# Patient Record
Sex: FEMALE | Race: OTHER | HISPANIC OR LATINO | Employment: UNEMPLOYED | URBAN - METROPOLITAN AREA
[De-identification: names, ages, dates, MRNs, and addresses within clinical notes are randomized per-mention and may not be internally consistent; named-entity substitution may affect disease eponyms.]

---
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
## 2023-02-27 ENCOUNTER — OFFICE VISIT (OUTPATIENT)
Dept: URGENT CARE | Age: 18
End: 2023-02-27

## 2023-02-27 VITALS
SYSTOLIC BLOOD PRESSURE: 120 MMHG | OXYGEN SATURATION: 98 % | DIASTOLIC BLOOD PRESSURE: 60 MMHG | RESPIRATION RATE: 18 BRPM | HEART RATE: 100 BPM | TEMPERATURE: 97.7 F

## 2023-02-27 DIAGNOSIS — R11.2 NAUSEA, VOMITING AND DIARRHEA: Primary | ICD-10-CM

## 2023-02-27 DIAGNOSIS — R19.7 NAUSEA, VOMITING AND DIARRHEA: Primary | ICD-10-CM

## 2023-02-27 NOTE — PATIENT INSTRUCTIONS
Drink liquids as directed  Ask your healthcare provider how much liquid to drink each day, and which liquids are best for you  You may also need to drink an oral rehydration solution (ORS)  An ORS has the right amounts of sugar, salt, and minerals in water to replace body fluids  Eat bland foods  When you feel hungry, begin eating soft, bland foods  Examples are bananas, clear soup, potatoes, and applesauce  Do not have dairy products, alcohol, sugary drinks, or drinks with caffeine until you feel better  Rest as much as possible  Slowly start to do more each day when you begin to feel better

## 2023-02-27 NOTE — LETTER
February 27, 2023     Patient: Emma Jasso   YOB: 2005   Date of Visit: 2/27/2023       To Whom it May Concern:    Jazmin Llanes was seen in my clinic on 2/27/2023  She may return to school on 3/1/2023 as long as her symptoms have resolved       If you have any questions or concerns, please don't hesitate to call           Sincerely,          Benigno Pereira, RUBEN        CC: No Recipients

## 2023-02-27 NOTE — PROGRESS NOTES
[facility]        NAME: Nevin Coffey is a 21 y o  female  : 2005    MRN: [id number]  DATE: 2023  TIME: 3:51 PM    Assessment and Plan   Nausea, vomiting and diarrhea [R11 2, R19 7]  1  Nausea, vomiting and diarrhea              Patient Instructions     Ensure adequate fluid intake, normal urine output  Sugarcreek diet as described  Follow up with PCP in 3-5 days  Proceed to  ER if symptoms worsen  Chief Complaint     Chief Complaint   Patient presents with   • Vomiting     Patient started to having N/V/D on Friday  She was experiencing a burning in her lower abdominal area and then was throwing up for a couple days  She started to feel better slightly and then when she woke this morning and she felt worse that she has since Friday  She is going to the [place] about 10 times a day and throws up with every BM  She states she is still trying to eat and drink but very light  History of Present Illness       Patient presenting for evaluation of abdominal pain, vomiting and diarrhea  Patient states that she had abdominal pain 4 days ago, and the next day began with vomiting  Patient states that her emesis is bilious, but denies any blood  She states that today she began having nonbloody diarrhea  She denies any exposure to anyone with similar symptoms  She states that she has decreased appetite, but is tolerating drinking and eating  She states that her urine output is normal   She denies any fevers or chills at this time  Review of Systems   Review of Systems   Constitutional: Negative for chills and fever  HENT: Negative for congestion and sore throat  Respiratory: Negative for cough and shortness of breath  Cardiovascular: Negative for chest pain  Gastrointestinal: Positive for abdominal pain, diarrhea, nausea and vomiting  All other systems reviewed and are negative  Current Medications     No current outpatient medications on file      Current Allergies     Allergies as of 02/27/2023   • (No Known Allergies)            The following portions of the patient's history were reviewed and updated as appropriate: allergies, current medications, past family history, past medical history, past social history, past surgical history and problem list      Past Medical History:   Diagnosis Date   • Allergic rhinitis        Past Surgical History:   Procedure Laterality Date   • TONSILLECTOMY         Family History   Problem Relation Age of Onset   • Crohn's disease Mother    • No Known Problems Father    • Asthma Brother    • Diabetes Family    • Hyperlipidemia Family    • Heart disease Family          Medications have been verified  Objective   /60   Pulse 100   Temp 97 7 °F (36 5 °C)   Resp 18   SpO2 98%        Physical Exam     Physical Exam  Vitals and nursing note reviewed  Constitutional:       General: She is not in acute distress  Appearance: Normal appearance  She is not ill-appearing, toxic-appearing or diaphoretic  HENT:      Head: Normocephalic and atraumatic  Eyes:      General:         Right eye: No discharge  Left eye: No discharge  Cardiovascular:      Rate and Rhythm: Normal rate and regular rhythm  Pulses: Normal pulses  Heart sounds: Normal heart sounds  No murmur heard  No friction rub  No gallop  Pulmonary:      Effort: Pulmonary effort is normal  No respiratory distress  Breath sounds: Normal breath sounds  No stridor  No wheezing, rhonchi or rales  Chest:      Chest wall: No tenderness  Abdominal:      General: Bowel sounds are normal       Palpations: Abdomen is soft  Tenderness: There is no abdominal tenderness  There is no guarding or rebound  Skin:     General: Skin is warm and dry  Neurological:      Mental Status: She is alert     Psychiatric:         Mood and Affect: Mood normal          Behavior: Behavior normal

## 2023-05-16 ENCOUNTER — HOSPITAL ENCOUNTER (EMERGENCY)
Facility: HOSPITAL | Age: 18
Discharge: HOME | End: 2023-05-16
Attending: EMERGENCY MEDICINE

## 2023-05-16 VITALS
SYSTOLIC BLOOD PRESSURE: 118 MMHG | OXYGEN SATURATION: 98 % | DIASTOLIC BLOOD PRESSURE: 59 MMHG | HEART RATE: 71 BPM | RESPIRATION RATE: 16 BRPM | HEIGHT: 64 IN | BODY MASS INDEX: 40.65 KG/M2 | TEMPERATURE: 98.7 F | WEIGHT: 238.1 LBS

## 2023-05-16 DIAGNOSIS — M54.50 ACUTE LOW BACK PAIN: Primary | ICD-10-CM

## 2023-05-16 RX ORDER — ACETAMINOPHEN 325 MG/1
975 TABLET ORAL ONCE
Status: COMPLETED | OUTPATIENT
Start: 2023-05-16 | End: 2023-05-16

## 2023-05-16 RX ORDER — ACETAMINOPHEN 500 MG
500 TABLET ORAL EVERY 6 HOURS PRN
Qty: 30 TABLET | Refills: 0 | Status: SHIPPED | OUTPATIENT
Start: 2023-05-16

## 2023-05-16 RX ORDER — NAPROXEN 500 MG/1
500 TABLET ORAL 2 TIMES DAILY WITH MEALS
Qty: 30 TABLET | Refills: 0 | Status: SHIPPED | OUTPATIENT
Start: 2023-05-16

## 2023-05-16 RX ORDER — KETOROLAC TROMETHAMINE 30 MG/ML
15 INJECTION, SOLUTION INTRAMUSCULAR; INTRAVENOUS ONCE
Status: COMPLETED | OUTPATIENT
Start: 2023-05-16 | End: 2023-05-16

## 2023-05-16 RX ADMIN — ACETAMINOPHEN 325MG 975 MG: 325 TABLET ORAL at 18:37

## 2023-05-16 RX ADMIN — KETOROLAC TROMETHAMINE 15 MG: 30 INJECTION, SOLUTION INTRAMUSCULAR; INTRAVENOUS at 18:38

## 2023-05-16 NOTE — Clinical Note
Francisco Casas was seen and treated in our emergency department on 5/16/2023  Diagnosis:     Magan Mcknight  may return to work on return date  She may return on this date: 05/18/2023         If you have any questions or concerns, please don't hesitate to call        Tory Alfaro PA-C    ______________________________           _______________          _______________  Hospital Representative                              Date                                Time

## 2023-05-16 NOTE — Clinical Note
Zuleykaraza Carthage was seen and treated in our emergency department on 5/16/2023  Diagnosis:     Dawna Acosta  may return to school on return date  She may return on this date: 05/18/2023         If you have any questions or concerns, please don't hesitate to call        Claudia Pressley PA-C    ______________________________           _______________          _______________  Hospital Representative                              Date                                Time

## 2023-07-11 ENCOUNTER — OFFICE VISIT (OUTPATIENT)
Dept: URGENT CARE | Age: 18
End: 2023-07-11
Payer: COMMERCIAL

## 2023-07-11 VITALS
SYSTOLIC BLOOD PRESSURE: 139 MMHG | TEMPERATURE: 98.7 F | HEART RATE: 80 BPM | OXYGEN SATURATION: 100 % | RESPIRATION RATE: 16 BRPM | DIASTOLIC BLOOD PRESSURE: 79 MMHG

## 2023-07-11 DIAGNOSIS — M26.629 TMJ SYNDROME: Primary | ICD-10-CM

## 2023-07-11 PROCEDURE — 99213 OFFICE O/P EST LOW 20 MIN: CPT

## 2023-07-11 NOTE — PATIENT INSTRUCTIONS
Tylenol/ibuprofen as needed  Ice 20 minutes 3-4 times per day  Insulate the skin from the ice to prevent frostbite  Follow up with PCP in 3-5 days. Proceed to ER if symptoms worsen.

## 2023-07-11 NOTE — PROGRESS NOTES
[facility]        NAME: Veronica Bonilla is a 21 y.o. female  : 2005    MRN: [id number]  DATE: 2023  TIME: 6:00 PM    Assessment and Plan   TMJ syndrome [M26.629]  1. TMJ syndrome              Patient Instructions     Tylenol/ibuprofen as needed  Ice 20 minutes 3-4 times per day  Insulate the skin from the ice to prevent frostbite  Follow up with PCP in 3-5 days. Proceed to  ER if symptoms worsen. Chief Complaint     Chief Complaint   Patient presents with   • Earache     Right jaw and right ear pain x 2 days          History of Present Illness       Patient is a 22 yo female with no significant PMH presenting in the clinic today for right ear pain x 2 days. Admits pain is located along the right TMJ. Denies fever, chills, chest pain, SOB, headache, dizziness, ear discharge, tinnitus, and sore throat. Denies the use of OTC treatment for symptom management. Review of Systems   Review of Systems   Constitutional: Negative for chills and fever. HENT: Positive for ear pain. Negative for dental problem, ear discharge, hearing loss, sore throat and tinnitus. Respiratory: Negative for shortness of breath. Cardiovascular: Negative for chest pain. Neurological: Negative for dizziness and headaches.          Current Medications       Current Outpatient Medications:   •  acetaminophen (TYLENOL) 500 mg tablet, Take 1 tablet (500 mg total) by mouth every 6 (six) hours as needed for mild pain, Disp: 30 tablet, Rfl: 0  •  naproxen (Naprosyn) 500 mg tablet, Take 1 tablet (500 mg total) by mouth 2 (two) times a day with meals, Disp: 30 tablet, Rfl: 0    Current Allergies     Allergies as of 2023   • (No Known Allergies)            The following portions of the patient's history were reviewed and updated as appropriate: allergies, current medications, past family history, past medical history, past social history, past surgical history and problem list.     Past Medical History: Diagnosis Date   • Allergic rhinitis        Past Surgical History:   Procedure Laterality Date   • TONSILLECTOMY         Family History   Problem Relation Age of Onset   • Crohn's disease Mother    • No Known Problems Father    • Asthma Brother    • Diabetes Family    • Hyperlipidemia Family    • Heart disease Family          Medications have been verified. Objective   /79 (BP Location: Left arm, Patient Position: Sitting, Cuff Size: Adult)   Pulse 80   Temp 98.7 °F (37.1 °C) (Tympanic)   Resp 16   SpO2 100%        Physical Exam     Physical Exam  Vitals reviewed. Constitutional:       General: She is not in acute distress. Appearance: Normal appearance. She is normal weight. She is not ill-appearing. HENT:      Head: Normocephalic. Jaw: Tenderness present. Comments: TTP of right TMJ     Right Ear: Hearing, tympanic membrane, ear canal and external ear normal. No middle ear effusion. There is no impacted cerumen. Tympanic membrane is not erythematous or bulging. Left Ear: Hearing, tympanic membrane, ear canal and external ear normal.  No middle ear effusion. There is no impacted cerumen. Tympanic membrane is not erythematous or bulging. Nose: Nose normal. No congestion or rhinorrhea. Right Sinus: No maxillary sinus tenderness or frontal sinus tenderness. Left Sinus: No maxillary sinus tenderness or frontal sinus tenderness. Mouth/Throat:      Lips: Pink. No lesions. Mouth: Mucous membranes are moist.      Pharynx: Oropharynx is clear. Uvula midline. No pharyngeal swelling, oropharyngeal exudate, posterior oropharyngeal erythema or uvula swelling. Tonsils: No tonsillar exudate or tonsillar abscesses. 1+ on the right. 1+ on the left. Eyes:      Conjunctiva/sclera: Conjunctivae normal.   Cardiovascular:      Rate and Rhythm: Normal rate and regular rhythm. Pulses: Normal pulses. Heart sounds: Normal heart sounds. No murmur heard. No friction rub. No gallop. Pulmonary:      Effort: Pulmonary effort is normal.      Breath sounds: Normal breath sounds. No wheezing, rhonchi or rales. Musculoskeletal:      Cervical back: Normal range of motion and neck supple. Skin:     General: Skin is warm. Findings: No rash. Neurological:      Mental Status: She is alert.    Psychiatric:         Mood and Affect: Mood normal.         Behavior: Behavior normal.

## 2023-07-11 NOTE — LETTER
July 11, 2023     Patient: Vladimir Sutton   YOB: 2005   Date of Visit: 7/11/2023       To Whom it May Concern:    Shirley Arevalo was seen in my clinic on 7/11/2023. If you have any questions or concerns, please don't hesitate to call.          Sincerely,          Kathrin Brian PA-C        CC: No Recipients

## 2023-07-13 ENCOUNTER — HOSPITAL ENCOUNTER (EMERGENCY)
Facility: HOSPITAL | Age: 18
Discharge: HOME | End: 2023-07-14
Attending: EMERGENCY MEDICINE
Payer: COMMERCIAL

## 2023-07-13 VITALS
RESPIRATION RATE: 18 BRPM | HEART RATE: 85 BPM | SYSTOLIC BLOOD PRESSURE: 114 MMHG | BODY MASS INDEX: 40.45 KG/M2 | WEIGHT: 235.67 LBS | TEMPERATURE: 97.9 F | OXYGEN SATURATION: 98 % | DIASTOLIC BLOOD PRESSURE: 56 MMHG

## 2023-07-13 DIAGNOSIS — R19.7 NAUSEA, VOMITING, AND DIARRHEA: ICD-10-CM

## 2023-07-13 DIAGNOSIS — R11.2 NAUSEA, VOMITING, AND DIARRHEA: ICD-10-CM

## 2023-07-13 DIAGNOSIS — H66.91 RIGHT OTITIS MEDIA: Primary | ICD-10-CM

## 2023-07-13 LAB
EXT PREGNANCY TEST URINE: NEGATIVE
EXT. CONTROL: NORMAL

## 2023-07-13 PROCEDURE — 96375 TX/PRO/DX INJ NEW DRUG ADDON: CPT

## 2023-07-13 PROCEDURE — 96361 HYDRATE IV INFUSION ADD-ON: CPT

## 2023-07-13 PROCEDURE — 96374 THER/PROPH/DIAG INJ IV PUSH: CPT

## 2023-07-13 PROCEDURE — 99284 EMERGENCY DEPT VISIT MOD MDM: CPT

## 2023-07-13 PROCEDURE — 99283 EMERGENCY DEPT VISIT LOW MDM: CPT

## 2023-07-13 PROCEDURE — 81025 URINE PREGNANCY TEST: CPT

## 2023-07-13 RX ORDER — KETOROLAC TROMETHAMINE 30 MG/ML
15 INJECTION, SOLUTION INTRAMUSCULAR; INTRAVENOUS ONCE
Status: COMPLETED | OUTPATIENT
Start: 2023-07-13 | End: 2023-07-13

## 2023-07-13 RX ORDER — AMOXICILLIN 250 MG/1
500 CAPSULE ORAL ONCE
Status: COMPLETED | OUTPATIENT
Start: 2023-07-13 | End: 2023-07-13

## 2023-07-13 RX ORDER — ONDANSETRON 2 MG/ML
4 INJECTION INTRAMUSCULAR; INTRAVENOUS ONCE
Status: COMPLETED | OUTPATIENT
Start: 2023-07-13 | End: 2023-07-13

## 2023-07-13 RX ADMIN — AMOXICILLIN 500 MG: 250 CAPSULE ORAL at 23:24

## 2023-07-13 RX ADMIN — KETOROLAC TROMETHAMINE 15 MG: 30 INJECTION, SOLUTION INTRAMUSCULAR; INTRAVENOUS at 23:25

## 2023-07-13 RX ADMIN — ONDANSETRON 4 MG: 2 INJECTION INTRAMUSCULAR; INTRAVENOUS at 23:25

## 2023-07-13 RX ADMIN — SODIUM CHLORIDE 1000 ML: 0.9 INJECTION, SOLUTION INTRAVENOUS at 23:27

## 2023-07-13 NOTE — Clinical Note
Reena Lobatocarlos was seen and treated in our emergency department on 7/13/2023. Diagnosis:     Jayden Felix  may return to school on return date. She may return on this date: 07/17/2023         If you have any questions or concerns, please don't hesitate to call.       Maci Kennedy PA-C    ______________________________           _______________          _______________  Hospital Representative                              Date                                Time

## 2023-07-14 RX ORDER — AMOXICILLIN 500 MG/1
500 CAPSULE ORAL EVERY 12 HOURS SCHEDULED
Qty: 20 CAPSULE | Refills: 0 | Status: SHIPPED | OUTPATIENT
Start: 2023-07-14 | End: 2023-07-24

## 2023-07-14 RX ORDER — ONDANSETRON 4 MG/1
4 TABLET, FILM COATED ORAL EVERY 6 HOURS PRN
Qty: 20 TABLET | Refills: 0 | Status: SHIPPED | OUTPATIENT
Start: 2023-07-14

## 2023-07-14 NOTE — ED PROVIDER NOTES
History  Chief Complaint   Patient presents with   • Medical Problem     Pt reports being seen at Urgent care on Tuesday and was told that she has TMJ, last night started with fever, nausea, vomiting and diarrhea. Last Motrin about 6:30pm.      The patient is a 42-year-old female with no pertinent past medical history, who presents for evaluation of flulike symptoms. Right ear pain since Sunday, tried candle for earwax without difference in the pain  Went to urgent care and was diagnosed with TMJ  Has had persistent pain since that time is now radiating up toward her head  Last night also developed nausea, vomiting, diarrhea, decreased appetite fevers  -now with epigastric discomfort as she has only been keeping fluids down  No known sick contacts or suspicious food intake  Last dose of motrin at 6:30 PM    EXAM:  Right ear with cloudly fluid behind TM; slightly bulging  Not erythematous or injected  NO LAD  Throat and nose fine          Prior to Admission Medications   Prescriptions Last Dose Informant Patient Reported? Taking?   acetaminophen (TYLENOL) 500 mg tablet   No No   Sig: Take 1 tablet (500 mg total) by mouth every 6 (six) hours as needed for mild pain   naproxen (Naprosyn) 500 mg tablet   No No   Sig: Take 1 tablet (500 mg total) by mouth 2 (two) times a day with meals      Facility-Administered Medications: None       Past Medical History:   Diagnosis Date   • Allergic rhinitis        Past Surgical History:   Procedure Laterality Date   • TONSILLECTOMY         Family History   Problem Relation Age of Onset   • Crohn's disease Mother    • No Known Problems Father    • Asthma Brother    • Diabetes Family    • Hyperlipidemia Family    • Heart disease Family      I have reviewed and agree with the history as documented.     E-Cigarette/Vaping   • E-Cigarette Use Never User      E-Cigarette/Vaping Substances     Social History     Tobacco Use   • Smoking status: Never   • Smokeless tobacco: Never   Vaping Use   • Vaping Use: Never used   Substance Use Topics   • Alcohol use: No     Comment: No alcohol use - As per [employer]    • Drug use: No     Comment: Denied: History of drug use - As per [employer]        Review of Systems    Physical Exam  Physical Exam    Vital Signs  ED Triage Vitals [07/13/23 1946]   Temperature Pulse Respirations Blood Pressure SpO2   100 °F (37.8 °C) 102 18 161/77 98 %      Temp Source Heart Rate Source Patient Position - Orthostatic VS BP Location FiO2 (%)   Oral Monitor Sitting Right arm --      Pain Score       6           Vitals:    07/13/23 1946 07/13/23 2209   BP: 161/77 114/56   Pulse: 102 85   Patient Position - Orthostatic VS: Sitting Lying         Visual Acuity      ED Medications  Medications   sodium chloride 0.9 % bolus 1,000 mL (has no administration in time range)   ondansetron (ZOFRAN) injection 4 mg (has no administration in time range)   ketorolac (TORADOL) injection 15 mg (has no administration in time range)   amoxicillin (AMOXIL) capsule 500 mg (has no administration in time range)       Diagnostic Studies  Results Reviewed     Procedure Component Value Units Date/Time    POCT pregnancy, urine [[id number]]     Lab Status: No result                  No orders to display              Procedures  Procedures         ED Course         CRAFFT    Flowsheet Row Most Recent Value   CRAFFT Initial Screen: During the past 12 months, did you:    1. Drink any alcohol (more than a few sips)? No Filed at: 07/13/2023 2210   2. Smoke any marijuana or hashish No Filed at: 07/13/2023 2210   3. Use anything else to get high? ("anything else" includes illegal drugs, over the counter and prescription drugs, and things that you sniff or 'suarez')?  No Filed at: 07/13/2023 2210                                          MDM    Disposition  Final diagnoses:   None     ED Disposition     None      Follow-up Information    None         Patient's Medications   Discharge Prescriptions    No medications on file       No discharge procedures on file.     PDMP Review     None          ED Provider  Electronically Signed by tonsillar, preauricular or posterior auricular adenopathy. Cervical: No cervical adenopathy. Skin:     General: Skin is warm and dry. Capillary Refill: Capillary refill takes less than 2 seconds. Coloration: Skin is not pale. Findings: No petechiae or rash. Neurological:      Mental Status: She is alert and oriented to person, place, and time. Psychiatric:         Behavior: Behavior is cooperative. Vital Signs  ED Triage Vitals [07/13/23 1946]   Temperature Pulse Respirations Blood Pressure SpO2   100 °F (37.8 °C) 102 18 161/77 98 %      Temp Source Heart Rate Source Patient Position - Orthostatic VS BP Location FiO2 (%)   Oral Monitor Sitting Right arm --      Pain Score       6           Vitals:    07/13/23 1946 07/13/23 2209   BP: 161/77 114/56   Pulse: 102 85   Patient Position - Orthostatic VS: Sitting Lying         ED Medications  Medications   sodium chloride 0.9 % bolus 1,000 mL (0 mL Intravenous Stopped 7/14/23 0034)   ondansetron (ZOFRAN) injection 4 mg (4 mg Intravenous Given 7/13/23 2325)   ketorolac (TORADOL) injection 15 mg (15 mg Intravenous Given 7/13/23 2325)   amoxicillin (AMOXIL) capsule 500 mg (500 mg Oral Given 7/13/23 2324)       Diagnostic Studies  Results Reviewed     Procedure Component Value Units Date/Time    POCT pregnancy, urine [[id number]]  (Normal) Resulted: 07/13/23 2320    Lab Status: Final result Updated: 07/14/23 0035     EXT Preg Test, Ur Negative     Control Valid                 No orders to display              Procedures  Procedures         ED Course         CRAFFT    Flowsheet Row Most Recent Value   CRAFFT Initial Screen: During the past 12 months, did you:    1. Drink any alcohol (more than a few sips)? No Filed at: 07/13/2023 2210   2. Smoke any marijuana or hashish No Filed at: 07/13/2023 2210   3.  Use anything else to get high? ("anything else" includes illegal drugs, over the counter and prescription drugs, and things that you sniff or 'erick')? No Filed at: 07/13/2023 0550            Medical Decision Making  Patient presents with several days of right ear pain as well as 2 days of N/V/D with associated abdominal discomfort. Differential diagnosis includes but is not limited to otitis media, TM rupture, otitis externa, cerumen impaction, TMJ arthralgia, viral illness, gastroenteritis, gastritis, hepatitis, or food poisoning. Physical exam findings for the ear are most consistent with otitis media. Patient reported significant relief in her symptoms after fluids, Toradol, and Zofran. Prescriptions for amoxicillin and Zofran sent to the patient's pharmacy. She is in agreement with the treatment plan and all of her questions were answered to the best my ability. Strict return precaution discussed and she verbalized understanding. Follow-up with PCP, and return to the ED in the interim with new or worsening symptoms. Nausea, vomiting, and diarrhea: acute illness or injury  Right otitis media: acute illness or injury  Amount and/or Complexity of Data Reviewed  External Data Reviewed: labs and notes. Labs: ordered. Risk  Prescription drug management. Disposition  Final diagnoses:   Right otitis media   Nausea, vomiting, and diarrhea     Time reflects when diagnosis was documented in both MDM as applicable and the Disposition within this note     Time User Action Codes Description Comment    7/14/2023 12:19 AM Key Calvillo Add [H66.91] Right otitis media     7/14/2023 12:19 AM Key Calvillo Add [R11.2,  R19.7] Nausea, vomiting, and diarrhea       ED Disposition     ED Disposition   Discharge    Condition   Stable    Date/Time   Fri Jul 14, 2023 12:19 AM    Comment   Danitza Cook discharge to home/self care.                Follow-up Information     Follow up With Specialties Details Why Contact Info    Your primary care doctor              Discharge Medication List as of 7/14/2023 12:20 AM      START taking these medications Details   amoxicillin (AMOXIL) 500 mg capsule Take 1 capsule (500 mg total) by mouth every 12 (twelve) hours for 10 days, Starting Fri 7/14/2023, Until Mon 7/24/2023, Normal      ondansetron (Zofran) 4 mg tablet Take 1 tablet (4 mg total) by mouth every 6 (six) hours as needed for nausea or vomiting, Starting Fri 7/14/2023, Print         CONTINUE these medications which have NOT CHANGED    Details   acetaminophen (TYLENOL) 500 mg tablet Take 1 tablet (500 mg total) by mouth every 6 (six) hours as needed for mild pain, Starting Tue 5/16/2023, Normal      naproxen (Naprosyn) 500 mg tablet Take 1 tablet (500 mg total) by mouth 2 (two) times a day with meals, Starting Tue 5/16/2023, Normal             No discharge procedures on file.     PDMP Review     None          ED Provider  Electronically Signed by           Marlo Huggins PA-C  07/31/23 6284

## 2023-12-22 ENCOUNTER — OFFICE VISIT (OUTPATIENT)
Dept: URGENT CARE | Age: 18
End: 2023-12-22
Payer: COMMERCIAL

## 2023-12-22 VITALS
RESPIRATION RATE: 20 BRPM | TEMPERATURE: 97.3 F | DIASTOLIC BLOOD PRESSURE: 73 MMHG | HEART RATE: 77 BPM | SYSTOLIC BLOOD PRESSURE: 122 MMHG | BODY MASS INDEX: 36.9 KG/M2 | WEIGHT: 215 LBS | OXYGEN SATURATION: 98 %

## 2023-12-22 DIAGNOSIS — B96.89 ACUTE BACTERIAL BRONCHITIS: Primary | ICD-10-CM

## 2023-12-22 DIAGNOSIS — J20.8 ACUTE BACTERIAL BRONCHITIS: Primary | ICD-10-CM

## 2023-12-22 PROCEDURE — 99214 OFFICE O/P EST MOD 30 MIN: CPT | Performed by: PHYSICIAN ASSISTANT

## 2023-12-22 RX ORDER — ALBUTEROL SULFATE 90 UG/1
2 AEROSOL, METERED RESPIRATORY (INHALATION) EVERY 6 HOURS PRN
Qty: 8.5 G | Refills: 0 | Status: SHIPPED | OUTPATIENT
Start: 2023-12-22

## 2023-12-22 RX ORDER — AZITHROMYCIN 250 MG/1
TABLET, FILM COATED ORAL
Qty: 6 TABLET | Refills: 0 | Status: SHIPPED | OUTPATIENT
Start: 2023-12-22 | End: 2023-12-26

## 2023-12-22 NOTE — PATIENT INSTRUCTIONS
Take azithromycin as prescribed.  Use albuterol inhaler as needed as directed.  See attached instructions.  Mucinex DM as needed for cough.  If symptoms not improved in 2 to 3 days follow-up with PCP.  If symptoms worsen or new symptoms develop report to the emergency room immediately.

## 2023-12-22 NOTE — PROGRESS NOTES
[facility]        NAME: Santa Galdamez is a 18 y.o. female  : 2005    MRN: [id number]  DATE: 2023  TIME: 4:52 PM    Assessment and Plan   Acute bacterial bronchitis [J20.8, B96.89]  1. Acute bacterial bronchitis  azithromycin (ZITHROMAX) 250 mg tablet    albuterol (ProAir HFA) 90 mcg/act inhaler      Patient presents with symptoms and examination consistent with possible bacterial bronchitis.  She will be started on azithromycin to treat.  I have also provided her with an albuterol inhaler I recommend Mucinex DM for cough at this time.      Patient Instructions     Patient Instructions   Take azithromycin as prescribed.  Use albuterol inhaler as needed as directed.  See attached instructions.  Mucinex DM as needed for cough.  If symptoms not improved in 2 to 3 days follow-up with PCP.  If symptoms worsen or new symptoms develop report to the emergency room immediately.    Follow up with PCP in 3-5 days.  Proceed to  ER if symptoms worsen.    Chief Complaint     Chief Complaint   Patient presents with    Cough    Vomiting    Headache    Nausea     Vomiting, headache, nausea, chest congestion, cough,and mucus color yellow for 2 weeks.         History of Present Illness       18 year old female presents with complaints of headache, runny nose, sinus congestion, cough, chest congestion, nausea and vomiting and intermittent shortness of breath for 2 weeks. Pt has tried multiple over the counter medications with no benefit. Nasal drainage is clear, but sputum is yellow.     Cough  Associated symptoms include headaches, postnasal drip, rhinorrhea, a sore throat and shortness of breath. Pertinent negatives include no chest pain, chills or fever.   Vomiting   Associated symptoms include coughing and headaches. Pertinent negatives include no chest pain, chills, diarrhea or fever.   Headache  Nausea  Associated symptoms include congestion, coughing, headaches, nausea, a sore throat and vomiting.  Pertinent negatives include no chest pain, chills or fever.       Review of Systems   Review of Systems   Constitutional:  Negative for chills and fever.   HENT:  Positive for congestion, postnasal drip, rhinorrhea and sore throat.    Respiratory:  Positive for cough and shortness of breath.    Cardiovascular:  Negative for chest pain.   Gastrointestinal:  Positive for nausea and vomiting. Negative for diarrhea.   Neurological:  Positive for headaches.         Current Medications       Current Outpatient Medications:     albuterol (ProAir HFA) 90 mcg/act inhaler, Inhale 2 puffs every 6 (six) hours as needed for wheezing, Disp: 8.5 g, Rfl: 0    azithromycin (ZITHROMAX) 250 mg tablet, Take 2 tablets today then 1 tablet daily x 4 days, Disp: 6 tablet, Rfl: 0    acetaminophen (TYLENOL) 500 mg tablet, Take 1 tablet (500 mg total) by mouth every 6 (six) hours as needed for mild pain (Patient not taking: Reported on 12/22/2023), Disp: 30 tablet, Rfl: 0    naproxen (Naprosyn) 500 mg tablet, Take 1 tablet (500 mg total) by mouth 2 (two) times a day with meals (Patient not taking: Reported on 12/22/2023), Disp: 30 tablet, Rfl: 0    ondansetron (Zofran) 4 mg tablet, Take 1 tablet (4 mg total) by mouth every 6 (six) hours as needed for nausea or vomiting (Patient not taking: Reported on 12/22/2023), Disp: 20 tablet, Rfl: 0    Current Allergies     Allergies as of 12/22/2023    (No Known Allergies)            The following portions of the patient's history were reviewed and updated as appropriate: allergies, current medications, past family history, past medical history, past social history, past surgical history and problem list.     Past Medical History:   Diagnosis Date    Allergic rhinitis        Past Surgical History:   Procedure Laterality Date    TONSILLECTOMY         Family History   Problem Relation Age of Onset    Crohn's disease Mother     No Known Problems Father     Asthma Brother     Diabetes Family      Hyperlipidemia Family     Heart disease Family          Medications have been verified.        Objective   /73   Pulse 77   Temp (!) 97.3 °F (36.3 °C) (Temporal)   Resp 20   Wt 97.5 kg (215 lb)   LMP 12/22/2023 Comment: on her period now  SpO2 98%   BMI 36.90 kg/m²   Patient's last menstrual period was 12/22/2023.       Physical Exam     Physical Exam  Vitals and nursing note reviewed.   Constitutional:       General: She is not in acute distress.     Appearance: Normal appearance. She is not ill-appearing or toxic-appearing.   HENT:      Head: Normocephalic and atraumatic.      Jaw: No trismus.      Right Ear: Tympanic membrane, ear canal and external ear normal. There is no impacted cerumen. No foreign body.      Left Ear: Tympanic membrane, ear canal and external ear normal. There is no impacted cerumen. No foreign body.      Nose: No nasal deformity, mucosal edema, congestion or rhinorrhea.      Right Nostril: No foreign body, epistaxis or occlusion.      Left Nostril: No foreign body, epistaxis or occlusion.      Right Turbinates: Not enlarged, swollen or pale.      Left Turbinates: Not enlarged, swollen or pale.      Mouth/Throat:      Lips: Pink. No lesions.      Mouth: Mucous membranes are moist. No injury, oral lesions or angioedema.      Dentition: Normal dentition.      Tongue: No lesions. Tongue does not deviate from midline.      Palate: No mass and lesions.      Pharynx: Uvula midline. No pharyngeal swelling, oropharyngeal exudate, posterior oropharyngeal erythema or uvula swelling.      Tonsils: No tonsillar exudate or tonsillar abscesses.   Eyes:      General: Lids are normal. Gaze aligned appropriately. No allergic shiner.     Extraocular Movements: Extraocular movements intact.   Cardiovascular:      Rate and Rhythm: Normal rate and regular rhythm.      Heart sounds: Normal heart sounds, S1 normal and S2 normal. Heart sounds not distant. No murmur heard.     No friction rub. No gallop.  "  Pulmonary:      Effort: Pulmonary effort is normal.      Breath sounds: Normal breath sounds. No decreased breath sounds, wheezing, rhonchi or rales.      Comments: Patient speaking in full sentences with no increased respiratory effort.   No audible wheezing or stridor.   Lymphadenopathy:      Cervical: No cervical adenopathy.   Skin:     General: Skin is warm and dry.   Neurological:      Mental Status: She is alert and oriented to person, place, and time.      Coordination: Coordination is intact.      Gait: Gait is intact.   Psychiatric:         Attention and Perception: Attention and perception normal.         Mood and Affect: Mood and affect normal.         Speech: Speech normal.         Behavior: Behavior is cooperative.               Note: Portions of this record may have been created with voice recognition software. Occasional wrong word or \"sound a like\" substitutions may have occurred due to the inherent limitations of voice recognition software. Please read the chart carefully and recognize, using context, where substitutions have occurred.*      "

## 2023-12-22 NOTE — LETTER
December 22, 2023     Patient: Santa Galdamez   YOB: 2005   Date of Visit: 12/22/2023       To Whom it May Concern:    Santa Galdamez was seen in my clinic on 12/22/2023. She may return to school on 12/26/2023 .    If you have any questions or concerns, please don't hesitate to call.         Sincerely,          Kati Valdes PA-C        CC: No Recipients

## 2024-09-29 ENCOUNTER — HOSPITAL ENCOUNTER (EMERGENCY)
Facility: HOSPITAL | Age: 19
Discharge: HOME | DRG: 720 | End: 2024-09-29
Attending: EMERGENCY MEDICINE
Payer: COMMERCIAL

## 2024-09-29 ENCOUNTER — APPOINTMENT (EMERGENCY)
Dept: RADIOLOGY | Facility: HOSPITAL | Age: 19
DRG: 720 | End: 2024-09-29
Attending: EMERGENCY MEDICINE
Payer: COMMERCIAL

## 2024-09-29 VITALS
HEART RATE: 95 BPM | RESPIRATION RATE: 18 BRPM | OXYGEN SATURATION: 97 % | DIASTOLIC BLOOD PRESSURE: 90 MMHG | SYSTOLIC BLOOD PRESSURE: 113 MMHG | TEMPERATURE: 100.7 F

## 2024-09-29 DIAGNOSIS — R10.9 FLANK PAIN: Primary | ICD-10-CM

## 2024-09-29 DIAGNOSIS — R19.7 NAUSEA, VOMITING, AND DIARRHEA: ICD-10-CM

## 2024-09-29 DIAGNOSIS — R11.2 NAUSEA, VOMITING, AND DIARRHEA: ICD-10-CM

## 2024-09-29 DIAGNOSIS — N12 PYELONEPHRITIS: ICD-10-CM

## 2024-09-29 LAB
ALBUMIN SERPL BCG-MCNC: 4.5 G/DL (ref 3.5–5)
ALP SERPL-CCNC: 47 U/L (ref 34–104)
ALT SERPL W P-5'-P-CCNC: 24 U/L (ref 7–52)
ANION GAP SERPL CALCULATED.3IONS-SCNC: 10 MMOL/L (ref 4–13)
AST SERPL W P-5'-P-CCNC: 18 U/L (ref 13–39)
BACTERIA UR QL AUTO: ABNORMAL /HPF
BASOPHILS # BLD AUTO: 0.02 THOUSANDS/ΜL (ref 0–0.1)
BASOPHILS NFR BLD AUTO: 0 % (ref 0–1)
BILIRUB SERPL-MCNC: 0.62 MG/DL (ref 0.2–1)
BILIRUB UR QL STRIP: NEGATIVE
BUN SERPL-MCNC: 12 MG/DL (ref 5–25)
CALCIUM SERPL-MCNC: 9.3 MG/DL (ref 8.4–10.2)
CHLORIDE SERPL-SCNC: 103 MMOL/L (ref 96–108)
CLARITY UR: ABNORMAL
CO2 SERPL-SCNC: 24 MMOL/L (ref 21–32)
COLOR UR: ABNORMAL
CREAT SERPL-MCNC: 0.62 MG/DL (ref 0.6–1.3)
EOSINOPHIL # BLD AUTO: 0.01 THOUSAND/ΜL (ref 0–0.61)
EOSINOPHIL NFR BLD AUTO: 0 % (ref 0–6)
ERYTHROCYTE [DISTWIDTH] IN BLOOD BY AUTOMATED COUNT: 12.4 % (ref 11.6–15.1)
EXT PREGNANCY TEST URINE: NEGATIVE
EXT. CONTROL: NORMAL
GFR SERPL CREATININE-BSD FRML MDRD: 129 ML/MIN/1.73SQ M
GLUCOSE SERPL-MCNC: 102 MG/DL (ref 65–140)
GLUCOSE UR STRIP-MCNC: NEGATIVE MG/DL
HCT VFR BLD AUTO: 38.9 % (ref 34.8–46.1)
HGB BLD-MCNC: 13.4 G/DL (ref 11.5–15.4)
HGB UR QL STRIP.AUTO: ABNORMAL
IMM GRANULOCYTES # BLD AUTO: 0.04 THOUSAND/UL (ref 0–0.2)
IMM GRANULOCYTES NFR BLD AUTO: 0 % (ref 0–2)
KETONES UR STRIP-MCNC: NEGATIVE MG/DL
LEUKOCYTE ESTERASE UR QL STRIP: ABNORMAL
LIPASE SERPL-CCNC: 11 U/L (ref 11–82)
LYMPHOCYTES # BLD AUTO: 0.63 THOUSANDS/ΜL (ref 0.6–4.47)
LYMPHOCYTES NFR BLD AUTO: 5 % (ref 14–44)
MCH RBC QN AUTO: 29.1 PG (ref 26.8–34.3)
MCHC RBC AUTO-ENTMCNC: 34.4 G/DL (ref 31.4–37.4)
MCV RBC AUTO: 84 FL (ref 82–98)
MONOCYTES # BLD AUTO: 1.06 THOUSAND/ΜL (ref 0.17–1.22)
MONOCYTES NFR BLD AUTO: 9 % (ref 4–12)
MUCOUS THREADS UR QL AUTO: ABNORMAL
NEUTROPHILS # BLD AUTO: 10.59 THOUSANDS/ΜL (ref 1.85–7.62)
NEUTS SEG NFR BLD AUTO: 86 % (ref 43–75)
NITRITE UR QL STRIP: NEGATIVE
NON-SQ EPI CELLS URNS QL MICRO: ABNORMAL /HPF
NRBC BLD AUTO-RTO: 0 /100 WBCS
PH UR STRIP.AUTO: 5.5 [PH]
PLATELET # BLD AUTO: 307 THOUSANDS/UL (ref 149–390)
PMV BLD AUTO: 9.3 FL (ref 8.9–12.7)
POTASSIUM SERPL-SCNC: 3.9 MMOL/L (ref 3.5–5.3)
PROT SERPL-MCNC: 7.6 G/DL (ref 6.4–8.4)
PROT UR STRIP-MCNC: ABNORMAL MG/DL
RBC # BLD AUTO: 4.61 MILLION/UL (ref 3.81–5.12)
RBC #/AREA URNS AUTO: ABNORMAL /HPF
SODIUM SERPL-SCNC: 137 MMOL/L (ref 135–147)
SP GR UR STRIP.AUTO: 1.02 (ref 1–1.03)
UROBILINOGEN UR STRIP-ACNC: <2 MG/DL
WBC # BLD AUTO: 12.35 THOUSAND/UL (ref 4.31–10.16)
WBC #/AREA URNS AUTO: ABNORMAL /HPF

## 2024-09-29 PROCEDURE — 96375 TX/PRO/DX INJ NEW DRUG ADDON: CPT

## 2024-09-29 PROCEDURE — 85025 COMPLETE CBC W/AUTO DIFF WBC: CPT

## 2024-09-29 PROCEDURE — 87186 SC STD MICRODIL/AGAR DIL: CPT

## 2024-09-29 PROCEDURE — 96365 THER/PROPH/DIAG IV INF INIT: CPT

## 2024-09-29 PROCEDURE — 96376 TX/PRO/DX INJ SAME DRUG ADON: CPT

## 2024-09-29 PROCEDURE — 87086 URINE CULTURE/COLONY COUNT: CPT

## 2024-09-29 PROCEDURE — 80053 COMPREHEN METABOLIC PANEL: CPT

## 2024-09-29 PROCEDURE — 96361 HYDRATE IV INFUSION ADD-ON: CPT

## 2024-09-29 PROCEDURE — 74177 CT ABD & PELVIS W/CONTRAST: CPT

## 2024-09-29 PROCEDURE — 36415 COLL VENOUS BLD VENIPUNCTURE: CPT

## 2024-09-29 PROCEDURE — 81001 URINALYSIS AUTO W/SCOPE: CPT

## 2024-09-29 PROCEDURE — 83690 ASSAY OF LIPASE: CPT

## 2024-09-29 PROCEDURE — 99285 EMERGENCY DEPT VISIT HI MDM: CPT | Performed by: EMERGENCY MEDICINE

## 2024-09-29 PROCEDURE — 81025 URINE PREGNANCY TEST: CPT

## 2024-09-29 PROCEDURE — 99284 EMERGENCY DEPT VISIT MOD MDM: CPT

## 2024-09-29 PROCEDURE — 87077 CULTURE AEROBIC IDENTIFY: CPT

## 2024-09-29 RX ORDER — ONDANSETRON 4 MG/1
4 TABLET, FILM COATED ORAL EVERY 6 HOURS PRN
Qty: 20 TABLET | Refills: 0 | Status: SHIPPED | OUTPATIENT
Start: 2024-09-29 | End: 2024-09-30

## 2024-09-29 RX ORDER — ACETAMINOPHEN 325 MG/1
975 TABLET ORAL ONCE
Status: COMPLETED | OUTPATIENT
Start: 2024-09-29 | End: 2024-09-29

## 2024-09-29 RX ORDER — KETOROLAC TROMETHAMINE 30 MG/ML
15 INJECTION, SOLUTION INTRAMUSCULAR; INTRAVENOUS ONCE
Status: COMPLETED | OUTPATIENT
Start: 2024-09-29 | End: 2024-09-29

## 2024-09-29 RX ORDER — CEFPODOXIME PROXETIL 200 MG/1
200 TABLET, FILM COATED ORAL 2 TIMES DAILY
Qty: 28 TABLET | Refills: 0 | Status: SHIPPED | OUTPATIENT
Start: 2024-09-29 | End: 2024-09-30

## 2024-09-29 RX ORDER — OXYCODONE HYDROCHLORIDE 5 MG/1
5 TABLET ORAL ONCE
Status: COMPLETED | OUTPATIENT
Start: 2024-09-29 | End: 2024-09-29

## 2024-09-29 RX ADMIN — SODIUM CHLORIDE 1000 ML: 0.9 INJECTION, SOLUTION INTRAVENOUS at 13:49

## 2024-09-29 RX ADMIN — KETOROLAC TROMETHAMINE 15 MG: 30 INJECTION, SOLUTION INTRAMUSCULAR at 13:34

## 2024-09-29 RX ADMIN — KETOROLAC TROMETHAMINE 15 MG: 30 INJECTION, SOLUTION INTRAMUSCULAR at 16:12

## 2024-09-29 RX ADMIN — OXYCODONE HYDROCHLORIDE 5 MG: 5 TABLET ORAL at 16:12

## 2024-09-29 RX ADMIN — CEFTRIAXONE SODIUM 2000 MG: 10 INJECTION, POWDER, FOR SOLUTION INTRAVENOUS at 15:23

## 2024-09-29 RX ADMIN — ACETAMINOPHEN 975 MG: 325 TABLET ORAL at 13:35

## 2024-09-29 RX ADMIN — IOHEXOL 85 ML: 350 INJECTION, SOLUTION INTRAVENOUS at 14:56

## 2024-09-29 NOTE — ED ATTENDING ATTESTATION
9/29/2024  I, Mike Laguna MD, saw and evaluated the patient. I have discussed the patient with the resident/non-physician practitioner and agree with the resident's/non-physician practitioner's findings, Plan of Care, and MDM as documented in the resident's/non-physician practitioner's note, except where noted. All available labs and Radiology studies were reviewed.  I was present for key portions of any procedure(s) performed by the resident/non-physician practitioner and I was immediately available to provide assistance.       At this point I agree with the current assessment done in the Emergency Department.  I have conducted an independent evaluation of this patient a history and physical is as follows:  Patient presents with complaints of right flank and right abdominal pain that started yesterday late evening early morning she just did not feel well felt hot and cold this morning states she has some trouble urinating no dysuria or frequency of urination no prior kidney stone history  Patient is currently menstruating he denies vaginal discharge some nausea but no vomiting  States the pain seems better if she still worse if she moves a certain way  No diarrhea no melena  Exam the patient's somewhat uncomfortable appearing she is febrile at 100.7 sclera are anicteric mucous members are moist lungs clear heart regular soft positive bowel sounds there is tenderness in the right lower quadrant there is tenderness over the right flank area  Differential considerations would include pyelonephritis, kidney stone, appendicitis,  Urine urine pregnancy test labs pain control CT  ED Course         Critical Care Time  Procedures

## 2024-09-29 NOTE — ED PROVIDER NOTES
Final diagnoses:   Flank pain   Pyelonephritis     ED Disposition       ED Disposition   Discharge    Condition   Stable    Date/Time   Sun Sep 29, 2024  3:12 PM    Comment   Santa Galdamez discharge to home/self care.                   Assessment & Plan       Medical Decision Making  Patient is 19-year-old female presenting for flank pain, nausea.  DDx: Pyelonephritis, ureterolithiasis/nephrolithiasis, UTI, rule out pregnancy  Based on patient presentation and physical exam findings, primary concern is for pyelonephritis versus nephrolithiasis versus UTI.  Plan for abdominal lab workup, evaluate for pregnancy.  Pain control Toradol Tylenol fluids.  Reassessment pending labs and imaging.  UA concerning for infection.  Plan for dose of ceftriaxone given clinical suspicion for pyelonephritis.  Will plan for discharge with return precautions and outpatient prescription of cefpodoxime for coverage of pyelonephritis.  Patient understands agrees with plan.  Return cautions given.  Ready for discharge.    Problems Addressed:  Flank pain: acute illness or injury  Pyelonephritis: acute illness or injury    Amount and/or Complexity of Data Reviewed  Labs: ordered. Decision-making details documented in ED Course.  Radiology: ordered.    Risk  OTC drugs.  Prescription drug management.        ED Course as of 09/29/24 1606   Sun Sep 29, 2024   1354 WBC(!): 12.35   1606 On reassessment, patient much more comfortable appearing.  After discussion and mutual decision making we will plan for discharge with oral antibiotics and conservative pain control at home.  Strict return precautions given.  Patient understands and agrees with plan.  Ready for discharge.  Prescription sent to pharmacy.       Medications   ketorolac (TORADOL) injection 15 mg (has no administration in time range)   oxyCODONE (ROXICODONE) IR tablet 5 mg (has no administration in time range)   sodium chloride 0.9 % bolus 1,000 mL (1,000 mL Intravenous New Bag 9/29/24  1349)   acetaminophen (TYLENOL) tablet 975 mg (975 mg Oral Given 9/29/24 1335)   ketorolac (TORADOL) injection 15 mg (15 mg Intravenous Given 9/29/24 1334)   iohexol (OMNIPAQUE) 350 MG/ML injection (MULTI-DOSE) 85 mL (85 mL Intravenous Given 9/29/24 1456)   ceftriaxone (ROCEPHIN) 2 g/50 mL in dextrose IVPB (2,000 mg Intravenous New Bag 9/29/24 1523)       ED Risk Strat Scores                           SBIRT 20yo+      Flowsheet Row Most Recent Value   Initial Alcohol Screen: US AUDIT-C     1. How often do you have a drink containing alcohol? 0 Filed at: 09/29/2024 1312   2. How many drinks containing alcohol do you have on a typical day you are drinking?  0 Filed at: 09/29/2024 1312   3a. Male UNDER 65: How often do you have five or more drinks on one occasion? 0 Filed at: 09/29/2024 1312   3b. FEMALE Any Age, or MALE 65+: How often do you have 4 or more drinks on one occassion? 0 Filed at: 09/29/2024 1312   Audit-C Score 0 Filed at: 09/29/2024 1312   TK: How many times in the past year have you...    Used an illegal drug or used a prescription medication for non-medical reasons? Never Filed at: 09/29/2024 1312                            History of Present Illness       Chief Complaint   Patient presents with    Flank Pain     Pt has c/o R sided flank pain that started last night       Past Medical History:   Diagnosis Date    Allergic rhinitis       Past Surgical History:   Procedure Laterality Date    TONSILLECTOMY        Family History   Problem Relation Age of Onset    Crohn's disease Mother     No Known Problems Father     Asthma Brother     Diabetes Family     Hyperlipidemia Family     Heart disease Family       Social History     Tobacco Use    Smoking status: Never    Smokeless tobacco: Never   Vaping Use    Vaping status: Never Used   Substance Use Topics    Alcohol use: No     Comment: No alcohol use - As per [employer]     Drug use: No     Comment: Denied: History of drug use - As per [employer]        E-Cigarette/Vaping    E-Cigarette Use Never User       E-Cigarette/Vaping Substances      I have reviewed and agree with the history as documented.     HPI  Patient is 19-year-old female presenting for concerns of right flank pain radiation in the lower abdomen as well as now radiation to the left flank on presentation.  No significant past medical history.  Patient states symptoms began in the last 24 to 48 hours.  Endorses mild nausea but no vomiting no diarrhea.  Denies any vaginal bleeding or discharge.  Denies any dysuria or hematuria.  Patient has never had kidney stones in the past.  Review of Systems   Constitutional: Negative.    HENT: Negative.     Eyes: Negative.    Respiratory: Negative.     Cardiovascular: Negative.    Gastrointestinal:  Positive for abdominal pain and nausea. Negative for diarrhea and vomiting.   Endocrine: Negative.    Genitourinary:  Positive for flank pain.   Skin: Negative.    Allergic/Immunologic: Negative.    Neurological: Negative.    Hematological: Negative.    Psychiatric/Behavioral: Negative.     All other systems reviewed and are negative.          Objective       ED Triage Vitals [09/29/24 1309]   Temperature Pulse Blood Pressure Respirations SpO2 Patient Position - Orthostatic VS   (!) 100.7 °F (38.2 °C) (!) 108 147/83 18 96 % Sitting      Temp Source Heart Rate Source BP Location FiO2 (%) Pain Score    Oral Monitor Left arm -- 10 - Worst Possible Pain      Vitals      Date and Time Temp Pulse SpO2 Resp BP Pain Score FACES Pain Rating User   09/29/24 1334 -- -- -- -- -- No Pain -- BL   09/29/24 1309 100.7 °F (38.2 °C) 108 96 % 18 147/83 10 - Worst Possible Pain -- KRR            Physical Exam  Vitals and nursing note reviewed.   Constitutional:       Appearance: She is normal weight.      Comments: Patient is uncomfortable appearing secondary to pain but vital stable.   HENT:      Head: Normocephalic and atraumatic.      Right Ear: Tympanic membrane, ear canal and  external ear normal.      Left Ear: Tympanic membrane, ear canal and external ear normal.      Nose: Nose normal.      Mouth/Throat:      Mouth: Mucous membranes are moist.      Pharynx: Oropharynx is clear.   Eyes:      Extraocular Movements: Extraocular movements intact.      Conjunctiva/sclera: Conjunctivae normal.      Pupils: Pupils are equal, round, and reactive to light.   Cardiovascular:      Rate and Rhythm: Regular rhythm. Tachycardia present.      Pulses: Normal pulses.      Heart sounds: Normal heart sounds.   Pulmonary:      Effort: Pulmonary effort is normal.      Breath sounds: Normal breath sounds.   Abdominal:      General: Abdomen is flat. Bowel sounds are normal.      Palpations: Abdomen is soft.      Tenderness: There is abdominal tenderness. There is no guarding or rebound.      Comments: Mild tenderness palpation in the suprapubic area however no rebound no guarding.   Musculoskeletal:         General: Normal range of motion.      Cervical back: Normal range of motion and neck supple.   Skin:     General: Skin is warm and dry.      Capillary Refill: Capillary refill takes less than 2 seconds.   Neurological:      General: No focal deficit present.      Mental Status: She is alert and oriented to person, place, and time.   Psychiatric:         Mood and Affect: Mood normal.         Behavior: Behavior normal.         Thought Content: Thought content normal.         Judgment: Judgment normal.         Results Reviewed       Procedure Component Value Units Date/Time    Comprehensive metabolic panel [[id number]] Collected: 09/29/24 1337    Lab Status: Final result Specimen: Blood from Arm, Left Updated: 09/29/24 1414     Sodium 137 mmol/L      Potassium 3.9 mmol/L      Chloride 103 mmol/L      CO2 24 mmol/L      ANION GAP 10 mmol/L      BUN 12 mg/dL      Creatinine 0.62 mg/dL      Glucose 102 mg/dL      Calcium 9.3 mg/dL      AST 18 U/L      ALT 24 U/L      Alkaline Phosphatase 47 U/L      Total  Protein 7.6 g/dL      Albumin 4.5 g/dL      Total Bilirubin 0.62 mg/dL      eGFR 129 ml/min/1.73sq m     Narrative:      National Kid[employer] guidelines for Chronic Kidney Disease (CKD):     Stage 1 with normal or high GFR (GFR > 90 mL/min/1.73 square meters)    Stage 2 Mild CKD (GFR = 60-89 mL/min/1.73 square meters)    Stage 3A Moderate CKD (GFR = 45-59 mL/min/1.73 square meters)    Stage 3B Moderate CKD (GFR = 30-44 mL/min/1.73 square meters)    Stage 4 Severe CKD (GFR = 15-29 mL/min/1.73 square meters)    Stage 5 End Stage CKD (GFR <15 mL/min/1.73 square meters)  Note: GFR calculation is accurate only with a steady state creatinine    Lipase [[id number]]  (Normal) Collected: 09/29/24 1337    Lab Status: Final result Specimen: Blood from Arm, Left Updated: 09/29/24 1414     Lipase 11 u/L     Urine Microscopic [[id number]]  (Abnormal) Collected: 09/29/24 1346    Lab Status: Final result Specimen: Urine, Clean Catch Updated: 09/29/24 1409     RBC, UA Innumerable /hpf      WBC, UA Innumerable /hpf      Epithelial Cells Occasional /hpf      Bacteria, UA Occasional /hpf      MUCUS THREADS Occasional    Urine culture [[id number]] Collected: 09/29/24 1346    Lab Status: In process Specimen: Urine, Clean Catch Updated: 09/29/24 1409    UA w Reflex to Microscopic w Reflex to Culture [[id number]]  (Abnormal) Collected: 09/29/24 1346    Lab Status: Final result Specimen: Urine, Clean Catch Updated: 09/29/24 1358     Color, UA Light Yellow     Clarity, UA Turbid     Specific Gravity, UA 1.017     pH, UA 5.5     Leukocytes, UA Large     Nitrite, UA Negative     Protein, UA 30 (1+) mg/dl      Glucose, UA Negative mg/dl      Ketones, UA Negative mg/dl      Urobilinogen, UA <2.0 mg/dl      Bilirubin, UA Negative     Occult Blood, UA Large    POCT pregnancy, urine [[id number]]  (Normal) Resulted: 09/29/24 1349    Lab Status: Final result Updated: 09/29/24 1349     EXT Preg Test, Ur Negative     Control Valid    CBC and  differential [[id number]]  (Abnormal) Collected: 09/29/24 1337    Lab Status: Final result Specimen: Blood from Arm, Left Updated: 09/29/24 1347     WBC 12.35 Thousand/uL      RBC 4.61 Million/uL      Hemoglobin 13.4 g/dL      Hematocrit 38.9 %      MCV 84 fL      MCH 29.1 pg      MCHC 34.4 g/dL      RDW 12.4 %      MPV 9.3 fL      Platelets 307 Thousands/uL      nRBC 0 /100 WBCs      Segmented % 86 %      Immature Grans % 0 %      Lymphocytes % 5 %      Monocytes % 9 %      Eosinophils Relative 0 %      Basophils Relative 0 %      Absolute Neutrophils 10.59 Thousands/µL      Absolute Immature Grans 0.04 Thousand/uL      Absolute Lymphocytes 0.63 Thousands/µL      Absolute Monocytes 1.06 Thousand/µL      Eosinophils Absolute 0.01 Thousand/µL      Basophils Absolute 0.02 Thousands/µL             CT abdomen pelvis with contrast   Final Interpretation by Vasile Flaherty MD (09/29 1537)      Heterogeneous enhancement of the right renal upper pole with perinephric stranding consistent with acute pyelonephritis.         Workstation performed: [id number]             Procedures    ED Medication and Procedure Management   Prior to Admission Medications   Prescriptions Last Dose Informant Patient Reported? Taking?   acetaminophen (TYLENOL) 500 mg tablet   No No   Sig: Take 1 tablet (500 mg total) by mouth every 6 (six) hours as needed for mild pain   Patient not taking: Reported on 12/22/2023   albuterol (ProAir HFA) 90 mcg/act inhaler   No No   Sig: Inhale 2 puffs every 6 (six) hours as needed for wheezing   naproxen (Naprosyn) 500 mg tablet   No No   Sig: Take 1 tablet (500 mg total) by mouth 2 (two) times a day with meals   Patient not taking: Reported on 12/22/2023   ondansetron (Zofran) 4 mg tablet   No No   Sig: Take 1 tablet (4 mg total) by mouth every 6 (six) hours as needed for nausea or vomiting   Patient not taking: Reported on 12/22/2023   ondansetron (Zofran) 4 mg tablet   No Yes   Sig: Take 1 tablet (4 mg total)  by mouth every 6 (six) hours as needed for nausea or vomiting      Facility-Administered Medications: None     Patient's Medications   Discharge Prescriptions    CEFPODOXIME (VANTIN) 200 MG TABLET    Take 1 tablet (200 mg total) by mouth 2 (two) times a day for 14 days       Start Date: 9/29/2024 End Date: 10/13/2024       Order Dose: 200 mg       Quantity: 28 tablet    Refills: 0     No discharge procedures on file.  ED SEPSIS DOCUMENTATION   Time reflects when diagnosis was documented in both MDM as applicable and the Disposition within this note       Time User Action Codes Description Comment    9/29/2024  3:12 PM Rex Guerrero [R10.9] Flank pain     9/29/2024  3:12 PM Rex Guerrero [N12] Pyelonephritis     9/29/2024  4:05 PM Rex Guerrero [R11.2,  R19.7] Nausea, vomiting, and diarrhea                  Rex Guerrero MD  09/29/24 6948

## 2024-09-29 NOTE — DISCHARGE INSTRUCTIONS
Please take antibiotic as prescribed for treatment of concerns of possible pyelonephritis.  Please follow-up with your family care physician as needed.    Your CT results are below:  CT abdomen pelvis with contrast    Result Date: 9/29/2024  Impression: Heterogeneous enhancement of the right renal upper pole with perinephric stranding consistent with acute pyelonephritis. Workstation performed: [id number]

## 2024-09-30 ENCOUNTER — HOSPITAL ENCOUNTER (INPATIENT)
Facility: HOSPITAL | Age: 19
LOS: 1 days | Discharge: HOME | DRG: 720 | End: 2024-10-03
Attending: EMERGENCY MEDICINE | Admitting: FAMILY MEDICINE
Payer: COMMERCIAL

## 2024-09-30 DIAGNOSIS — N12 PYELONEPHRITIS: Primary | ICD-10-CM

## 2024-09-30 DIAGNOSIS — R51.9 HEADACHE: ICD-10-CM

## 2024-09-30 DIAGNOSIS — R11.0 NAUSEA: ICD-10-CM

## 2024-09-30 PROBLEM — A41.9 SEPSIS (HCC): Status: ACTIVE | Noted: 2024-09-30

## 2024-09-30 LAB
ANION GAP SERPL CALCULATED.3IONS-SCNC: 7 MMOL/L (ref 4–13)
ATRIAL RATE: 100 BPM
BASOPHILS # BLD AUTO: 0.03 THOUSANDS/ΜL (ref 0–0.1)
BASOPHILS NFR BLD AUTO: 0 % (ref 0–1)
BUN SERPL-MCNC: 19 MG/DL (ref 5–25)
CALCIUM SERPL-MCNC: 8.1 MG/DL (ref 8.4–10.2)
CARDIAC TROPONIN I PNL SERPL HS: 3 NG/L
CHLORIDE SERPL-SCNC: 103 MMOL/L (ref 96–108)
CO2 SERPL-SCNC: 26 MMOL/L (ref 21–32)
CREAT SERPL-MCNC: 1.29 MG/DL (ref 0.6–1.3)
EOSINOPHIL # BLD AUTO: 0.05 THOUSAND/ΜL (ref 0–0.61)
EOSINOPHIL NFR BLD AUTO: 0 % (ref 0–6)
ERYTHROCYTE [DISTWIDTH] IN BLOOD BY AUTOMATED COUNT: 12.9 % (ref 11.6–15.1)
GFR SERPL CREATININE-BSD FRML MDRD: 59 ML/MIN/1.73SQ M
GLUCOSE SERPL-MCNC: 171 MG/DL (ref 65–140)
HCT VFR BLD AUTO: 31.6 % (ref 34.8–46.1)
HGB BLD-MCNC: 10.9 G/DL (ref 11.5–15.4)
IMM GRANULOCYTES # BLD AUTO: 0.08 THOUSAND/UL (ref 0–0.2)
IMM GRANULOCYTES NFR BLD AUTO: 1 % (ref 0–2)
LYMPHOCYTES # BLD AUTO: 0.57 THOUSANDS/ΜL (ref 0.6–4.47)
LYMPHOCYTES NFR BLD AUTO: 4 % (ref 14–44)
MCH RBC QN AUTO: 29.5 PG (ref 26.8–34.3)
MCHC RBC AUTO-ENTMCNC: 34.5 G/DL (ref 31.4–37.4)
MCV RBC AUTO: 85 FL (ref 82–98)
MONOCYTES # BLD AUTO: 0.96 THOUSAND/ΜL (ref 0.17–1.22)
MONOCYTES NFR BLD AUTO: 7 % (ref 4–12)
NEUTROPHILS # BLD AUTO: 11.96 THOUSANDS/ΜL (ref 1.85–7.62)
NEUTS SEG NFR BLD AUTO: 88 % (ref 43–75)
NRBC BLD AUTO-RTO: 0 /100 WBCS
P AXIS: 35 DEGREES
PLATELET # BLD AUTO: 224 THOUSANDS/UL (ref 149–390)
PMV BLD AUTO: 9.7 FL (ref 8.9–12.7)
POTASSIUM SERPL-SCNC: 3.6 MMOL/L (ref 3.5–5.3)
PR INTERVAL: 146 MS
QRS AXIS: 92 DEGREES
QRSD INTERVAL: 92 MS
QT INTERVAL: 340 MS
QTC INTERVAL: 438 MS
RBC # BLD AUTO: 3.7 MILLION/UL (ref 3.81–5.12)
SODIUM SERPL-SCNC: 136 MMOL/L (ref 135–147)
T WAVE AXIS: 40 DEGREES
VENTRICULAR RATE: 100 BPM
WBC # BLD AUTO: 13.65 THOUSAND/UL (ref 4.31–10.16)

## 2024-09-30 PROCEDURE — 93005 ELECTROCARDIOGRAM TRACING: CPT

## 2024-09-30 PROCEDURE — 80048 BASIC METABOLIC PNL TOTAL CA: CPT | Performed by: STUDENT IN AN ORGANIZED HEALTH CARE EDUCATION/TRAINING PROGRAM

## 2024-09-30 PROCEDURE — 36415 COLL VENOUS BLD VENIPUNCTURE: CPT | Performed by: STUDENT IN AN ORGANIZED HEALTH CARE EDUCATION/TRAINING PROGRAM

## 2024-09-30 PROCEDURE — 93010 ELECTROCARDIOGRAM REPORT: CPT | Performed by: INTERNAL MEDICINE

## 2024-09-30 PROCEDURE — 84484 ASSAY OF TROPONIN QUANT: CPT | Performed by: STUDENT IN AN ORGANIZED HEALTH CARE EDUCATION/TRAINING PROGRAM

## 2024-09-30 PROCEDURE — 85025 COMPLETE CBC W/AUTO DIFF WBC: CPT | Performed by: STUDENT IN AN ORGANIZED HEALTH CARE EDUCATION/TRAINING PROGRAM

## 2024-09-30 PROCEDURE — 99223 1ST HOSP IP/OBS HIGH 75: CPT

## 2024-09-30 PROCEDURE — 99284 EMERGENCY DEPT VISIT MOD MDM: CPT

## 2024-09-30 PROCEDURE — 96368 THER/DIAG CONCURRENT INF: CPT

## 2024-09-30 PROCEDURE — 96375 TX/PRO/DX INJ NEW DRUG ADDON: CPT

## 2024-09-30 PROCEDURE — 96365 THER/PROPH/DIAG IV INF INIT: CPT

## 2024-09-30 PROCEDURE — 99284 EMERGENCY DEPT VISIT MOD MDM: CPT | Performed by: EMERGENCY MEDICINE

## 2024-09-30 RX ORDER — ONDANSETRON 2 MG/ML
4 INJECTION INTRAMUSCULAR; INTRAVENOUS EVERY 4 HOURS PRN
Status: DISCONTINUED | OUTPATIENT
Start: 2024-09-30 | End: 2024-10-03 | Stop reason: HOSPADM

## 2024-09-30 RX ORDER — OXYCODONE HYDROCHLORIDE 5 MG/1
5 TABLET ORAL ONCE
Status: COMPLETED | OUTPATIENT
Start: 2024-09-30 | End: 2024-09-30

## 2024-09-30 RX ORDER — ACETAMINOPHEN 325 MG/1
975 TABLET ORAL EVERY 8 HOURS PRN
Status: DISCONTINUED | OUTPATIENT
Start: 2024-09-30 | End: 2024-10-03 | Stop reason: HOSPADM

## 2024-09-30 RX ORDER — SODIUM CHLORIDE 9 MG/ML
100 INJECTION, SOLUTION INTRAVENOUS CONTINUOUS
Status: DISCONTINUED | OUTPATIENT
Start: 2024-09-30 | End: 2024-10-03 | Stop reason: HOSPADM

## 2024-09-30 RX ORDER — KETOROLAC TROMETHAMINE 30 MG/ML
15 INJECTION, SOLUTION INTRAMUSCULAR; INTRAVENOUS ONCE
Status: COMPLETED | OUTPATIENT
Start: 2024-09-30 | End: 2024-09-30

## 2024-09-30 RX ADMIN — CEFTRIAXONE SODIUM 1000 MG: 10 INJECTION, POWDER, FOR SOLUTION INTRAVENOUS at 19:10

## 2024-09-30 RX ADMIN — KETOROLAC TROMETHAMINE 15 MG: 30 INJECTION, SOLUTION INTRAMUSCULAR; INTRAVENOUS at 19:10

## 2024-09-30 RX ADMIN — SODIUM CHLORIDE, SODIUM LACTATE, POTASSIUM CHLORIDE, AND CALCIUM CHLORIDE 1000 ML: .6; .31; .03; .02 INJECTION, SOLUTION INTRAVENOUS at 19:10

## 2024-09-30 RX ADMIN — SODIUM CHLORIDE 100 ML/HR: 0.9 INJECTION, SOLUTION INTRAVENOUS at 21:15

## 2024-09-30 RX ADMIN — OXYCODONE HYDROCHLORIDE 5 MG: 5 TABLET ORAL at 19:10

## 2024-09-30 NOTE — ASSESSMENT & PLAN NOTE
Heterogeneous enhancement of the right renal upper pole with perinephric stranding consistent with acute pyelonephritis.

## 2024-09-30 NOTE — H&P
H&P - Hospitalist   Name: Santa Galdamez 19 y.o. female I MRN: [id number]  Unit/Bed#: ED 24 I Date of Admission: 9/30/2024   Date of Service: 9/30/2024 I Hospital Day: 0   { Disappearing Link I Update Problem List I PORCH I On-Call     Assessment & Plan  Sepsis (HCC)  With tachycardia elevated WBC and pyelonephritis  Received ceftriaxone in the emergency department  Continue ceftriaxone  Follow cultures  Continue IV fluids  Trend WBC pressure     Pyelonephritis  Heterogeneous enhancement of the right renal upper pole with perinephric stranding consistent with acute pyelonephritis.   Plan noted above  VTE Pharmacologic Prophylaxis:   Low Risk (Score 0-2) - Encourage Ambulation.  VTE Pharmacologic Prophylaxis:   Low Risk (Score 0-2) - Encourage Ambulation.  Code Status: Level 1 full code  Discussion with family: Updated  (mother) at bedside.    Anticipated Length of Stay: Patient will be admitted on an observation basis with an anticipated length of stay of less than 2 midnights secondary to pyelonephritis.    History of Present Illness   Chief Complaint: Flank pain    Santa Galdamez is a 19 y.o. female Patient comes in for hospital flank pain out of right, went to the emergency department nephritis. Tried to do treatment patient feels well. Poor oral intake. At went to the emergency department  No other subjective symptoms    Review of Systems   Constitutional:  Negative for chills and fever.   HENT:  Negative for ear pain and sore throat.    Eyes:  Negative for pain and visual disturbance.   Respiratory:  Negative for cough and shortness of breath.    Cardiovascular:  Negative for chest pain and palpitations.   Gastrointestinal:  Negative for abdominal pain and vomiting.   Genitourinary:  Negative for dysuria and hematuria.   Musculoskeletal:  Negative for arthralgias and back pain.   Skin:  Negative for color change and rash.   Neurological:  Negative for seizures and syncope.   All other systems  reviewed and are negative.      I have reviewed the patient's PMH, PSH, Social History, Family History, Meds, and Allergies  Historical Information   Past Medical History:   Diagnosis Date    Allergic rhinitis      Past Surgical History:   Procedure Laterality Date    TONSILLECTOMY       Social History     Tobacco Use    Smoking status: Never    Smokeless tobacco: Never   Vaping Use    Vaping status: Never Used   Substance and Sexual Activity    Alcohol use: No     Comment: No alcohol use - As per [employer]     Drug use: No     Comment: Denied: History of drug use - As per [employer]     Sexual activity: Not on file     E-Cigarette/Vaping    E-Cigarette Use Never User      E-Cigarette/Vaping Substances     Family history non-contributory  Social History     Tobacco Use    Smoking status: Never    Smokeless tobacco: Never   Vaping Use    Vaping status: Never Used   Substance and Sexual Activity    Alcohol use: No     Comment: No alcohol use - As per [employer]     Drug use: No     Comment: Denied: History of drug use - As per [employer]     Sexual activity: Not on file       Current Facility-Administered Medications:     lactated ringers bolus 1,000 mL, Once, Last Rate: 1,000 mL (09/30/24 1910)  Prior to Admission Medications   Prescriptions Last Dose Informant Patient Reported? Taking?   acetaminophen (TYLENOL) 500 mg tablet   No No   Sig: Take 1 tablet (500 mg total) by mouth every 6 (six) hours as needed for mild pain   Patient not taking: Reported on 12/22/2023   albuterol (ProAir HFA) 90 mcg/act inhaler   No No   Sig: Inhale 2 puffs every 6 (six) hours as needed for wheezing   cefpodoxime (VANTIN) 200 mg tablet   No No   Sig: Take 1 tablet (200 mg total) by mouth 2 (two) times a day for 14 days   naproxen (Naprosyn) 500 mg tablet   No No   Sig: Take 1 tablet (500 mg total) by mouth 2 (two) times a day with meals   Patient not taking: Reported on 12/22/2023   ondansetron (Zofran) 4 mg tablet   No No   Sig: Take  "1 tablet (4 mg total) by mouth every 6 (six) hours as needed for nausea or vomiting      Facility-Administered Medications: None     Patient has no known allergies.  Social History:  Marital Status: Single   Occupation: None  Patient Pre-hospital Living Situation: Home  Patient Pre-hospital Level of Mobility: walks  Patient Pre-hospital Diet Restrictions: none    Objective     Vitals:   Blood Pressure: 116/79 (09/30/24 1827)  Pulse: (!) 112 (09/30/24 1827)  Temperature: 99.4 °F (37.4 °C) (09/30/24 1827)  Temp Source: Temporal (09/30/24 1827)  Respirations: 18 (09/30/24 1827)  Height: 5' 4\" (162.6 cm) (09/30/24 1827)  Weight - Scale: 104 kg (230 lb) (09/30/24 1827)  SpO2: 99 % (09/30/24 1827)    Physical Exam  Constitutional:       Appearance: She is not ill-appearing or toxic-appearing.   HENT:      Head: Normocephalic and atraumatic.      Right Ear: There is no impacted cerumen.      Left Ear: There is no impacted cerumen.      Mouth/Throat:      Mouth: Mucous membranes are moist.      Pharynx: Oropharynx is clear.   Eyes:      Conjunctiva/sclera: Conjunctivae normal.      Pupils: Pupils are equal, round, and reactive to light.   Cardiovascular:      Rate and Rhythm: Normal rate and regular rhythm.      Pulses: Normal pulses.      Heart sounds: Normal heart sounds. No murmur heard.     No friction rub.   Pulmonary:      Effort: No respiratory distress.   Abdominal:      General: There is no distension.      Palpations: There is no mass.      Tenderness: There is no abdominal tenderness.      Hernia: No hernia is present.   Musculoskeletal:         General: No swelling, tenderness or deformity.      Cervical back: Normal range of motion.   Skin:     Coloration: Skin is not jaundiced.      Findings: No rash.   Neurological:      General: No focal deficit present.         Lines/Drains:  Lines/Drains/Airways       Active Status       None                        Additional Data:   Lab Results: I have reviewed the " "following results: CBC/BMP:   .     09/30/24  1855   WBC 13.65*   HGB 10.9*   HCT 31.6*      SODIUM 136   K 3.6      CO2 26   BUN 19   CREATININE 1.29   GLUC 171*      Results from last 7 days   Lab Units 09/30/24  1855   WBC Thousand/uL 13.65*   HEMOGLOBIN g/dL 10.9*   HEMATOCRIT % 31.6*   PLATELETS Thousands/uL 224   SEGS PCT % 88*   LYMPHO PCT % 4*   MONO PCT % 7   EOS PCT % 0     Results from last 7 days   Lab Units 09/30/24  1855 09/29/24  1337   SODIUM mmol/L 136 137   POTASSIUM mmol/L 3.6 3.9   CHLORIDE mmol/L 103 103   CO2 mmol/L 26 24   BUN mg/dL 19 12   CREATININE mg/dL 1.29 0.62   ANION GAP mmol/L 7 10   CALCIUM mg/dL 8.1* 9.3   ALBUMIN g/dL  --  4.5   TOTAL BILIRUBIN mg/dL  --  0.62   ALK PHOS U/L  --  47   ALT U/L  --  24   AST U/L  --  18   GLUCOSE RANDOM mg/dL 171* 102             No results found for: \"HGBA1C\"        Imaging Review: Reviewed radiology reports from this admission including: CT abdomen/pelvis.  Other Studies: EKG was reviewed.     Administrative Statements   I have spent a total time of 60 minutes in caring for this patient on the day of the visit/encounter including Diagnostic results, Prognosis, Risks and benefits of tx options, Instructions for management, Patient and family education, Importance of tx compliance, Risk factor reductions, Impressions, Counseling / Coordination of care, Documenting in the medical record, Reviewing / ordering tests, medicine, procedures  , Obtaining or reviewing history  , and Communicating with other healthcare professionals .    ** Please Note: This note has been constructed using a voice recognition system. **    "

## 2024-09-30 NOTE — ASSESSMENT & PLAN NOTE
With tachycardia elevated WBC and pyelonephritis  Received ceftriaxone in the emergency department  Continue ceftriaxone  Follow cultures  Continue IV fluids  Trend WBC pressure

## 2024-09-30 NOTE — ED ATTENDING ATTESTATION
I, Edwar Putnam MD, saw and evaluated the patient. I have discussed the patient with the resident and agree with the resident's findings, Plan of Care, and MDM as documented in the resident's note, except where noted. All available labs and Radiology studies were reviewed.  I was present for key portions of any procedure(s) performed by the resident and I was immediately available to provide assistance.    At this point I agree with the current assessment done in the Emergency Department.  I have conducted an independent evaluation of this patient a history and physical is as follows:    18 yo female with a history of allergic rhinitis brought to the ED by her mother for evaluation of worsening right flank pain, nausea, vomiting, diarrhea, and poor PO intake. The patient was diagnosed with pyelonephritis in the ED yesterday and discharged on an oral antibiotic. She says all of her symptoms have worsened since she returned home. (+) Severe nausea and multiple episodes of NBNB vomiting. She was able to take her antibiotics but has not had much to eat or drink all day. (+) Subjective fevers and chills. No vaginal bleeding or discharge. She denies dysuria and hematuria. (+) Secondary complaint of a vague anterior chest discomfort. No other specific complaints.    ROS: per resident physician note    Gen: uncomfortable appearing, AA&Ox3  HEENT: PERRL, EOMI  Neck: supple  CV: (+) tachycardia  Lungs: CTA B/L  Abdomen: soft, NT/ND  Back: (+) right CVA tenderness  Ext: no swelling or deformity  Neuro: 5/5 strength all extremities, sensation grossly intact  Skin: no rash    ED Course  The patient is uncomfortable appearing and tachycardic on arrival to the ED. All of her symptoms have worsened since discharge yesterday. (+) Increased pain and very poor PO intake at home. Will check EKG, basic labs, and troponin. IVFs, Toradol and Zofran administered. Will continue to monitor in the ED. Disposition per workup and  reassessment.       Critical Care Time  Procedures

## 2024-10-01 PROBLEM — R73.9 HYPERGLYCEMIA: Status: ACTIVE | Noted: 2024-10-01

## 2024-10-01 PROBLEM — E87.6 HYPOKALEMIA: Status: ACTIVE | Noted: 2024-10-01

## 2024-10-01 PROBLEM — N17.9 AKI (ACUTE KIDNEY INJURY) (HCC): Status: ACTIVE | Noted: 2024-10-01

## 2024-10-01 LAB
ALBUMIN SERPL BCG-MCNC: 3.5 G/DL (ref 3.5–5)
ALP SERPL-CCNC: 44 U/L (ref 34–104)
ALT SERPL W P-5'-P-CCNC: 28 U/L (ref 7–52)
ANION GAP SERPL CALCULATED.3IONS-SCNC: 10 MMOL/L (ref 4–13)
AST SERPL W P-5'-P-CCNC: 22 U/L (ref 13–39)
BACTERIA UR CULT: ABNORMAL
BILIRUB SERPL-MCNC: 0.34 MG/DL (ref 0.2–1)
BUN SERPL-MCNC: 21 MG/DL (ref 5–25)
CALCIUM SERPL-MCNC: 8.2 MG/DL (ref 8.4–10.2)
CHLORIDE SERPL-SCNC: 104 MMOL/L (ref 96–108)
CO2 SERPL-SCNC: 23 MMOL/L (ref 21–32)
CREAT SERPL-MCNC: 1.43 MG/DL (ref 0.6–1.3)
ERYTHROCYTE [DISTWIDTH] IN BLOOD BY AUTOMATED COUNT: 12.8 % (ref 11.6–15.1)
EST. AVERAGE GLUCOSE BLD GHB EST-MCNC: 111 MG/DL
GFR SERPL CREATININE-BSD FRML MDRD: 52 ML/MIN/1.73SQ M
GLUCOSE P FAST SERPL-MCNC: 193 MG/DL (ref 65–99)
GLUCOSE SERPL-MCNC: 118 MG/DL (ref 65–140)
GLUCOSE SERPL-MCNC: 120 MG/DL (ref 65–140)
GLUCOSE SERPL-MCNC: 143 MG/DL (ref 65–140)
GLUCOSE SERPL-MCNC: 193 MG/DL (ref 65–140)
HBA1C MFR BLD: 5.5 %
HCT VFR BLD AUTO: 31.3 % (ref 34.8–46.1)
HGB BLD-MCNC: 10.4 G/DL (ref 11.5–15.4)
MCH RBC QN AUTO: 29 PG (ref 26.8–34.3)
MCHC RBC AUTO-ENTMCNC: 33.2 G/DL (ref 31.4–37.4)
MCV RBC AUTO: 87 FL (ref 82–98)
PLATELET # BLD AUTO: 225 THOUSANDS/UL (ref 149–390)
PMV BLD AUTO: 10 FL (ref 8.9–12.7)
POTASSIUM SERPL-SCNC: 3.4 MMOL/L (ref 3.5–5.3)
PROT SERPL-MCNC: 6.3 G/DL (ref 6.4–8.4)
RBC # BLD AUTO: 3.59 MILLION/UL (ref 3.81–5.12)
SODIUM SERPL-SCNC: 137 MMOL/L (ref 135–147)
WBC # BLD AUTO: 12.23 THOUSAND/UL (ref 4.31–10.16)

## 2024-10-01 PROCEDURE — 87040 BLOOD CULTURE FOR BACTERIA: CPT | Performed by: PHYSICIAN ASSISTANT

## 2024-10-01 PROCEDURE — 85027 COMPLETE CBC AUTOMATED: CPT

## 2024-10-01 PROCEDURE — 82948 REAGENT STRIP/BLOOD GLUCOSE: CPT

## 2024-10-01 PROCEDURE — 99232 SBSQ HOSP IP/OBS MODERATE 35: CPT | Performed by: PHYSICIAN ASSISTANT

## 2024-10-01 PROCEDURE — 83036 HEMOGLOBIN GLYCOSYLATED A1C: CPT | Performed by: PHYSICIAN ASSISTANT

## 2024-10-01 PROCEDURE — 80053 COMPREHEN METABOLIC PANEL: CPT

## 2024-10-01 RX ORDER — INSULIN LISPRO 100 [IU]/ML
1-5 INJECTION, SOLUTION INTRAVENOUS; SUBCUTANEOUS
Status: DISCONTINUED | OUTPATIENT
Start: 2024-10-01 | End: 2024-10-03 | Stop reason: HOSPADM

## 2024-10-01 RX ORDER — POTASSIUM CHLORIDE 1500 MG/1
40 TABLET, EXTENDED RELEASE ORAL ONCE
Status: COMPLETED | OUTPATIENT
Start: 2024-10-01 | End: 2024-10-01

## 2024-10-01 RX ADMIN — ONDANSETRON 4 MG: 2 INJECTION INTRAMUSCULAR; INTRAVENOUS at 18:26

## 2024-10-01 RX ADMIN — POTASSIUM CHLORIDE 40 MEQ: 1500 TABLET, EXTENDED RELEASE ORAL at 11:35

## 2024-10-01 RX ADMIN — CEFTRIAXONE SODIUM 2000 MG: 10 INJECTION, POWDER, FOR SOLUTION INTRAVENOUS at 19:56

## 2024-10-01 RX ADMIN — ACETAMINOPHEN 975 MG: 325 TABLET ORAL at 00:41

## 2024-10-01 RX ADMIN — ACETAMINOPHEN 975 MG: 325 TABLET ORAL at 18:25

## 2024-10-01 RX ADMIN — ACETAMINOPHEN 975 MG: 325 TABLET ORAL at 09:32

## 2024-10-01 RX ADMIN — ONDANSETRON 4 MG: 2 INJECTION INTRAMUSCULAR; INTRAVENOUS at 09:32

## 2024-10-01 RX ADMIN — SODIUM CHLORIDE 100 ML/HR: 0.9 INJECTION, SOLUTION INTRAVENOUS at 22:18

## 2024-10-01 RX ADMIN — SODIUM CHLORIDE 100 ML/HR: 0.9 INJECTION, SOLUTION INTRAVENOUS at 07:55

## 2024-10-01 NOTE — CASE MANAGEMENT
Case Management Discharge Planning Note    Patient name Santa Galdamez  Location /-01 MRN [id number]  : 2005 Date 10/1/2024       Current Admission Date: 2024  Current Admission Diagnosis:Pyelonephritis   Patient Active Problem List    Diagnosis Date Noted Date Diagnosed    Pyelonephritis 2024     Sepsis (HCC) 2024     Hidradenitis suppurativa 2016     Allergic rhinitis 2016     Acanthosis nigricans 2016       LOS (days): 0  Geometric Mean LOS (GMLOS) (days):   Days to GMLOS:     OBJECTIVE:            Current admission status: Observation   Preferred Pharmacy:   [id number] - [place] - [address]  [place] [address]  Phone: [phone] Fax: [phone]    [id number] [place] [facility] [address] [facility]  [address] [facility]  [place] [address]  Phone: [phone] Fax: [phone]    Primary Care Provider: No primary care provider on file.    Primary Insurance: [employer]  Secondary Insurance:     DISCHARGE DETAILS:     Per chart review: 19 y.o. f admitted observation w/pyelonephritis, sepsis.  No CM needs noted at this time.

## 2024-10-01 NOTE — ASSESSMENT & PLAN NOTE
POA, aeb tachycardia and leukocytosis, in the setting of pyelo as above  Urine culture growing E. Coli, continue ceftriaxone  Blood cultures were not drawn on admission - check now

## 2024-10-01 NOTE — PLAN OF CARE
Problem: PAIN - ADULT  Goal: Verbalizes/displays adequate comfort level or baseline comfort level  Description: Interventions:  - Encourage patient to monitor pain and request assistance  - Assess pain using appropriate pain scale  - Administer analgesics based on type and severity of pain and evaluate response  - Implement non-pharmacological measures as appropriate and evaluate response  - Consider cultural and social influences on pain and pain management  - Notify physician/advanced practitioner if interventions unsuccessful or patient reports new pain  10/1/2024 0248 by David Moreno RN  Outcome: Progressing  10/1/2024 0248 by David Moreno RN  Outcome: Progressing     Problem: INFECTION - ADULT  Goal: Absence or prevention of progression during hospitalization  Description: INTERVENTIONS:  - Assess and monitor for signs and symptoms of infection  - Monitor lab/diagnostic results  - Monitor all insertion sites, i.e. indwelling lines, tubes, and drains  - Monitor endotracheal if appropriate and nasal secretions for changes in amount and color  - [place] appropriate cooling/warming therapies per order  - Administer medications as ordered  - Instruct and encourage patient and family to use good hand hygiene technique  - Identify and instruct in appropriate isolation precautions for identified infection/condition  10/1/2024 0248 by David Moreno RN  Outcome: Progressing  10/1/2024 0248 by David Moreno RN  Outcome: Progressing     Problem: SAFETY ADULT  Goal: Patient will remain free of falls  Description: INTERVENTIONS:  - Educate patient/family on patient safety including physical limitations  - Instruct patient to call for assistance with activity   - Consult OT/PT to assist with strengthening/mobility   - Keep Call bell within reach  - Keep bed low and locked with side rails adjusted as appropriate  - Keep care items and personal belongings within reach  - Initiate and maintain comfort rounds  - Make Fall Risk  Sign visible to staff  - Offer Toileting every 2-4 Hours, in advance of need  - Initiate/Maintain bed chair alarm  - Obtain necessary fall risk management equipment: nonskid footwear  - Apply yellow socks and bracelet for high fall risk patients  - Consider moving patient to room near nurses station  10/1/2024 0248 by David Moreno RN  Outcome: Progressing  10/1/2024 0248 by David Moreno RN  Outcome: Progressing  Goal: Maintain or return to baseline ADL function  Description: INTERVENTIONS:  -  Assess patient's ability to carry out ADLs; assess patient's baseline for ADL function and identify physical deficits which impact ability to perform ADLs (bathing, care of mouth/teeth, toileting, grooming, dressing, etc.)  - Assess/evaluate cause of self-care deficits   - Assess range of motion  - Assess patient's mobility; develop plan if impaired  - Assess patient's need for assistive devices and provide as appropriate  - Encourage maximum independence but intervene and supervise when necessary  - Involve family in performance of ADLs  - Assess for home care needs following discharge   - Consider OT consult to assist with ADL evaluation and planning for discharge  - Provide patient education as appropriate  10/1/2024 0248 by David Moreno RN  Outcome: Progressing  10/1/2024 0248 by David Moreno RN  Outcome: Progressing  Goal: Maintains/Returns to pre admission functional level  Description: INTERVENTIONS:  - Perform AM-PAC 6 Click Basic Mobility/ Daily Activity assessment daily.  - Set and communicate daily mobility goal to care team and patient/family/caregiver.   - Collaborate with rehabilitation services on mobility goals if consulted  - Perform Range of Motion 3 times a day.  - Reposition patient every 3 hours.  - Dangle patient 3 times a day  - Stand patient 3 times a day  - Ambulate patient 3 times a day  - Out of bed to chair 3 times a day   - Out of bed for meals 3 times a day  - Out of bed for toileting  - Record  patient progress and toleration of activity level   10/1/2024 0248 by David Moreno RN  Outcome: Progressing  10/1/2024 0248 by David Moreno RN  Outcome: Progressing     Problem: DISCHARGE PLANNING  Goal: Discharge to home or other facility with appropriate resources  Description: INTERVENTIONS:  - Identify barriers to discharge w/patient and caregiver  - Arrange for needed discharge resources and transportation as appropriate  - Identify discharge learning needs (meds, wound care, etc.)  - Arrange for interpretive services to assist at discharge as needed  - Refer to Case Management Department for coordinating discharge planning if the patient needs post-hospital services based on physician/advanced practitioner order or complex needs related to functional status, cognitive ability, or social support system  10/1/2024 0248 by David Moreno RN  Outcome: Progressing  10/1/2024 0248 by David Moreno RN  Outcome: Progressing     Problem: Knowledge Deficit  Goal: Patient/family/caregiver demonstrates understanding of disease process, treatment plan, medications, and discharge instructions  Description: Complete learning assessment and assess knowledge base.  Interventions:  - Provide teaching at level of understanding  - Provide teaching via preferred learning methods  10/1/2024 0248 by David Moreno RN  Outcome: Progressing  10/1/2024 0248 by David Moreno RN  Outcome: Progressing

## 2024-10-01 NOTE — ASSESSMENT & PLAN NOTE
"Patient presented with flank pain  CT on admission revealed \"Heterogeneous enhancement of the right renal upper pole with perinephric stranding consistent with acute pyelonephritis\"  Urine culture growing E. Coli, continue IV ceftriaxone  "

## 2024-10-01 NOTE — PLAN OF CARE
Problem: PAIN - ADULT  Goal: Verbalizes/displays adequate comfort level or baseline comfort level  Description: Interventions:  - Encourage patient to monitor pain and request assistance  - Assess pain using appropriate pain scale  - Administer analgesics based on type and severity of pain and evaluate response  - Implement non-pharmacological measures as appropriate and evaluate response  - Consider cultural and social influences on pain and pain management  - Notify physician/advanced practitioner if interventions unsuccessful or patient reports new pain  Outcome: Progressing     Problem: INFECTION - ADULT  Goal: Absence or prevention of progression during hospitalization  Description: INTERVENTIONS:  - Assess and monitor for signs and symptoms of infection  - Monitor lab/diagnostic results  - Monitor all insertion sites, i.e. indwelling lines, tubes, and drains  - Monitor endotracheal if appropriate and nasal secretions for changes in amount and color  - [place] appropriate cooling/warming therapies per order  - Administer medications as ordered  - Instruct and encourage patient and family to use good hand hygiene technique  - Identify and instruct in appropriate isolation precautions for identified infection/condition  Outcome: Progressing     Problem: SAFETY ADULT  Goal: Patient will remain free of falls  Description: INTERVENTIONS:  - Educate patient/family on patient safety including physical limitations  - Instruct patient to call for assistance with activity   - Consult OT/PT to assist with strengthening/mobility   - Keep Call bell within reach  - Keep bed low and locked with side rails adjusted as appropriate  - Keep care items and personal belongings within reach  - Initiate and maintain comfort rounds  - Make Fall Risk Sign visible to staff  - Offer Toileting every 2-4 Hours, in advance of need  - Initiate/Maintain bed chair alarm  - Obtain necessary fall risk management equipment:  nonskid  footwear  - Apply yellow socks and bracelet for high fall risk patients  - Consider moving patient to room near nurses station  Outcome: Progressing  Goal: Maintain or return to baseline ADL function  Description: INTERVENTIONS:  -  Assess patient's ability to carry out ADLs; assess patient's baseline for ADL function and identify physical deficits which impact ability to perform ADLs (bathing, care of mouth/teeth, toileting, grooming, dressing, etc.)  - Assess/evaluate cause of self-care deficits   - Assess range of motion  - Assess patient's mobility; develop plan if impaired  - Assess patient's need for assistive devices and provide as appropriate  - Encourage maximum independence but intervene and supervise when necessary  - Involve family in performance of ADLs  - Assess for home care needs following discharge   - Consider OT consult to assist with ADL evaluation and planning for discharge  - Provide patient education as appropriate  Outcome: Progressing  Goal: Maintains/Returns to pre admission functional level  Description: INTERVENTIONS:  - Perform AM-PAC 6 Click Basic Mobility/ Daily Activity assessment daily.  - Set and communicate daily mobility goal to care team and patient/family/caregiver.   - Collaborate with rehabilitation services on mobility goals if consulted  - Perform Range of Motion 3 times a day.  - Reposition patient every 3 hours.  - Dangle patient 3 times a day  - Stand patient 3 times a day  - Ambulate patient 3 times a day  - Out of bed to chair 3 times a day   - Out of bed for meals 3 times a day  - Out of bed for toileting  - Record patient progress and toleration of activity level   Outcome: Progressing     Problem: DISCHARGE PLANNING  Goal: Discharge to home or other facility with appropriate resources  Description: INTERVENTIONS:  - Identify barriers to discharge w/patient and caregiver  - Arrange for needed discharge resources and transportation as appropriate  - Identify discharge  learning needs (meds, wound care, etc.)  - Arrange for interpretive services to assist at discharge as needed  - Refer to Case Management Department for coordinating discharge planning if the patient needs post-hospital services based on physician/advanced practitioner order or complex needs related to functional status, cognitive ability, or social support system  Outcome: Progressing     Problem: Knowledge Deficit  Goal: Patient/family/caregiver demonstrates understanding of disease process, treatment plan, medications, and discharge instructions  Description: Complete learning assessment and assess knowledge base.  Interventions:  - Provide teaching at level of understanding  - Provide teaching via preferred learning methods  Outcome: Progressing

## 2024-10-01 NOTE — PROGRESS NOTES
"Progress Note - Hospitalist   Name: Santa Galdamez 19 y.o. female I MRN: [id number]  Unit/Bed#: -01 I Date of Admission: 9/30/2024   Date of Service: 10/1/2024 I Hospital Day: 0    Assessment & Plan  Sepsis (HCC)  POA, aeb tachycardia and leukocytosis, in the setting of pyelo as above  Urine culture growing E. Coli, continue ceftriaxone  Blood cultures were not drawn on admission - check now  Pyelonephritis  Patient presented with flank pain  CT on admission revealed \"Heterogeneous enhancement of the right renal upper pole with perinephric stranding consistent with acute pyelonephritis\"  Urine culture growing E. Coli, continue IV ceftriaxone  [facility] (acute kidney injury) ([facility])  Creatinine 1.43 this AM, up from baseline of around 0.5-0.6  In the setting of sepsis/pyelo as above, also recent CT with contrast and multiple doses of Toradol  Avoid hypotension and nephrotoxins  Continue IVF hydration  BMP in AM  Hyperglycemia   on AM labs  Check HbA1c  Start SSI TID AC and QHS  Hypokalemia  Replete and monitor  Check mag    VTE Pharmacologic Prophylaxis:   Low Risk (Score 0-2) - Encourage Ambulation.    Mobility:   Basic Mobility Inpatient Raw Score: 24  JH-HLM Goal: 8: Walk 250 feet or more  JH-HLM Achieved: 8: Walk 250 feet ot more  JH-HLM Goal achieved. Continue to encourage appropriate mobility.    Patient Centered Rounds: I performed bedside rounds with nursing staff today.  Rebecca  Discussions with Specialists or Other Care Team Provider:     Education and Discussions with Family / Patient: Patient declined call to .     Current Length of Stay: 0 day(s)  Current Patient Status: Observation   Certification Statement: The patient, admitted on an observation basis, will now require > 2 midnight hospital stay due to sepsis, IV abx  Discharge Plan: Anticipate discharge in 24-48 hrs to home.    Code Status: Level 1 - Full Code    Subjective   Ms. Galdamez reports nausea and flank pain    Objective "     Vitals:   Temp (24hrs), Av.8 °F (37.1 °C), Min:98.5 °F (36.9 °C), Max:99.4 °F (37.4 °C)    Temp:  [98.5 °F (36.9 °C)-99.4 °F (37.4 °C)] 98.5 °F (36.9 °C)  HR:  [] 95  Resp:  [16-18] 16  BP: (116-123)/(54-79) 123/58  SpO2:  [98 %-99 %] 98 %  Body mass index is 41.63 kg/m².     Input and Output Summary (last 24 hours):     Intake/Output Summary (Last 24 hours) at 10/1/2024 0941  Last data filed at 2024 2115  Gross per 24 hour   Intake 1000 ml   Output --   Net 1000 ml       Physical Exam  Vitals reviewed.   Constitutional:       Appearance: She is obese.      Comments: Patient seen lying in bed, RN present   Cardiovascular:      Rate and Rhythm: Tachycardia present.   Pulmonary:      Effort: Pulmonary effort is normal. No respiratory distress.      Breath sounds: Normal breath sounds.   Abdominal:      General: Bowel sounds are normal.      Palpations: Abdomen is soft.      Tenderness: There is abdominal tenderness.   Musculoskeletal:      Right lower leg: No edema.      Left lower leg: No edema.   Skin:     General: Skin is warm.   Neurological:      Mental Status: She is alert and oriented to person, place, and time.   Psychiatric:         Mood and Affect: Mood normal.          Lines/Drains:  Lines/Drains/Airways       Active Status       None                            Lab Results: I have reviewed the following results:    Results from last 7 days   Lab Units 10/01/24  04424  1855   WBC Thousand/uL 12.23* 13.65*   HEMOGLOBIN g/dL 10.4* 10.9*   HEMATOCRIT % 31.3* 31.6*   PLATELETS Thousands/uL 225 224   SEGS PCT %  --  88*   LYMPHO PCT %  --  4*   MONO PCT %  --  7   EOS PCT %  --  0     Results from last 7 days   Lab Units 10/01/24  0441   SODIUM mmol/L 137   POTASSIUM mmol/L 3.4*   CHLORIDE mmol/L 104   CO2 mmol/L 23   BUN mg/dL 21   CREATININE mg/dL 1.43*   ANION GAP mmol/L 10   CALCIUM mg/dL 8.2*   ALBUMIN g/dL 3.5   TOTAL BILIRUBIN mg/dL 0.34   ALK PHOS U/L 44   ALT U/L 28   AST U/L  22   GLUCOSE RANDOM mg/dL 193*                       Recent Cultures (last 7 days):   Results from last 7 days   Lab Units 09/29/24  1346   URINE CULTURE  20,000-29,000 cfu/ml Escherichia coli*       Imaging Review: Reviewed radiology reports from this admission including: CT abdomen/pelvis.  Other Studies: urine culture    Last 24 Hours Medication List:     Current Facility-Administered Medications:     acetaminophen (TYLENOL) tablet 975 mg, Q8H PRN    ceftriaxone (ROCEPHIN) 2 g/50 mL in dextrose IVPB, Q24H    ondansetron (ZOFRAN) injection 4 mg, Q4H PRN    sodium chloride 0.9 % infusion, Continuous, Last Rate: 100 mL/hr (10/01/24 4625)    Administrative Statements   Today, Patient Was Seen By: Nolan Diehl PA-C      **Please Note: This note may have been constructed using a voice recognition system.**

## 2024-10-01 NOTE — ASSESSMENT & PLAN NOTE
Creatinine 1.43 this AM, up from baseline of around 0.5-0.6  In the setting of sepsis/pyelo as above, also recent CT with contrast and multiple doses of Toradol  Avoid hypotension and nephrotoxins  Continue IVF hydration  BMP in AM

## 2024-10-01 NOTE — PLAN OF CARE
Problem: PAIN - ADULT  Goal: Verbalizes/displays adequate comfort level or baseline comfort level  Description: Interventions:  - Encourage patient to monitor pain and request assistance  - Assess pain using appropriate pain scale  - Administer analgesics based on type and severity of pain and evaluate response  - Implement non-pharmacological measures as appropriate and evaluate response  - Consider cultural and social influences on pain and pain management  - Notify physician/advanced practitioner if interventions unsuccessful or patient reports new pain  Outcome: Progressing     Problem: INFECTION - ADULT  Goal: Absence or prevention of progression during hospitalization  Description: INTERVENTIONS:  - Assess and monitor for signs and symptoms of infection  - Monitor lab/diagnostic results  - Monitor all insertion sites, i.e. indwelling lines, tubes, and drains  - Monitor endotracheal if appropriate and nasal secretions for changes in amount and color  - [place] appropriate cooling/warming therapies per order  - Administer medications as ordered  - Instruct and encourage patient and family to use good hand hygiene technique  - Identify and instruct in appropriate isolation precautions for identified infection/condition  Outcome: Progressing     Problem: SAFETY ADULT  Goal: Patient will remain free of falls  Description: INTERVENTIONS:  - Educate patient/family on patient safety including physical limitations  - Instruct patient to call for assistance with activity   - Consult OT/PT to assist with strengthening/mobility   - Keep Call bell within reach  - Keep bed low and locked with side rails adjusted as appropriate  - Keep care items and personal belongings within reach  - Initiate and maintain comfort rounds  - Make Fall Risk Sign visible to staff  - Offer Toileting every 2 Hours, in advance of need  - Initiate/Maintain alarm  - Obtain necessary fall risk management equipment:   - Apply yellow socks and  bracelet for high fall risk patients  - Consider moving patient to room near nurses station  Outcome: Progressing  Goal: Maintain or return to baseline ADL function  Description: INTERVENTIONS:  -  Assess patient's ability to carry out ADLs; assess patient's baseline for ADL function and identify physical deficits which impact ability to perform ADLs (bathing, care of mouth/teeth, toileting, grooming, dressing, etc.)  - Assess/evaluate cause of self-care deficits   - Assess range of motion  - Assess patient's mobility; develop plan if impaired  - Assess patient's need for assistive devices and provide as appropriate  - Encourage maximum independence but intervene and supervise when necessary  - Involve family in performance of ADLs  - Assess for home care needs following discharge   - Consider OT consult to assist with ADL evaluation and planning for discharge  - Provide patient education as appropriate  Outcome: Progressing  Goal: Maintains/Returns to pre admission functional level  Description: INTERVENTIONS:  - Perform AM-PAC 6 Click Basic Mobility/ Daily Activity assessment daily.  - Set and communicate daily mobility goal to care team and patient/family/caregiver.   - Collaborate with rehabilitation services on mobility goals if consulted  - Perform Range of Motion 3 times a day.  - Reposition patient every 2 hours.  - Dangle patient 3 times a day  - Stand patient 3 times a day  - Ambulate patient 3 times a day  - Out of bed to chair 3 times a day   - Out of bed for meals 3 times a day  - Out of bed for toileting  - Record patient progress and toleration of activity level   Outcome: Progressing     Problem: DISCHARGE PLANNING  Goal: Discharge to home or other facility with appropriate resources  Description: INTERVENTIONS:  - Identify barriers to discharge w/patient and caregiver  - Arrange for needed discharge resources and transportation as appropriate  - Identify discharge learning needs (meds, wound care,  etc.)  - Arrange for interpretive services to assist at discharge as needed  - Refer to Case Management Department for coordinating discharge planning if the patient needs post-hospital services based on physician/advanced practitioner order or complex needs related to functional status, cognitive ability, or social support system  Outcome: Progressing     Problem: Knowledge Deficit  Goal: Patient/family/caregiver demonstrates understanding of disease process, treatment plan, medications, and discharge instructions  Description: Complete learning assessment and assess knowledge base.  Interventions:  - Provide teaching at level of understanding  - Provide teaching via preferred learning methods  Outcome: Progressing

## 2024-10-01 NOTE — PROGRESS NOTES
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 3 or 4 weeks doing the all protein diet leading to discomfort.

## 2024-10-01 NOTE — ED PROVIDER NOTES
Final diagnoses:   Pyelonephritis   Nausea   Headache     ED Disposition       ED Disposition   Admit    Condition   Stable    Date/Time   Mon Sep 30, 2024  7:55 PM    Comment   Case was discussed with [facility] and the patient's admission status was agreed to be Admission Status: observation status to the service of Dr. Bustos .               Assessment & Plan       Medical Decision Making  19F w R pyelonephritis dx yesterday with failure of outpatient management--worsening pain, N/V and poor PO intake, diarrhea. Leukocytosis is mildly increased from yesterday. Tachycardia likely due to acute bacterial infection and fluid loss from diarrhea. Had complaints of chest discomfort, worked up with EKG and troponin, both normal. Started IV rocephin and LR. Patient would benefit from obs o/n for IV abx, IVF, eventual PO challenge.     Admit to [facility] for obs    Amount and/or Complexity of Data Reviewed  Labs: ordered.    Risk  Prescription drug management.  Decision regarding hospitalization.             Medications   lactated ringers bolus 1,000 mL (1,000 mL Intravenous New Bag 9/30/24 1910)   acetaminophen (TYLENOL) tablet 975 mg (has no administration in time range)   ondansetron (ZOFRAN) injection 4 mg (has no administration in time range)   ceftriaxone (ROCEPHIN) 2 g/50 mL in dextrose IVPB (has no administration in time range)   sodium chloride 0.9 % infusion (has no administration in time range)   oxyCODONE (ROXICODONE) IR tablet 5 mg (5 mg Oral Given 9/30/24 1910)   ketorolac (TORADOL) injection 15 mg (15 mg Intravenous Given 9/30/24 1910)   ceftriaxone (ROCEPHIN) 1 g/50 mL in dextrose IVPB (0 mg Intravenous Stopped 9/30/24 2030)       ED Risk Strat Scores                                               History of Present Illness       Chief Complaint   Patient presents with    Flank Pain     Pt was seen here yesterday and dx with kidney infection. Pt states that she is feeling worse today. Pt not eating or drinking        Past Medical History:   Diagnosis Date    Allergic rhinitis       Past Surgical History:   Procedure Laterality Date    TONSILLECTOMY        Family History   Problem Relation Age of Onset    Crohn's disease Mother     No Known Problems Father     Asthma Brother     Diabetes Family     Hyperlipidemia Family     Heart disease Family       Social History     Tobacco Use    Smoking status: Never    Smokeless tobacco: Never   Vaping Use    Vaping status: Never Used   Substance Use Topics    Alcohol use: No     Comment: No alcohol use - As per [employer]     Drug use: No     Comment: Denied: History of drug use - As per [employer]       E-Cigarette/Vaping    E-Cigarette Use Never User       E-Cigarette/Vaping Substances      I have reviewed and agree with the history as documented.       Flank Pain      Review of Systems   Genitourinary:  Positive for flank pain.           Objective       ED Triage Vitals [09/30/24 1827]   Temperature Pulse Blood Pressure Respirations SpO2 Patient Position - Orthostatic VS   99.4 °F (37.4 °C) (!) 112 116/79 18 99 % --      Temp Source Heart Rate Source BP Location FiO2 (%) Pain Score    Temporal -- -- -- 8      Vitals      Date and Time Temp Pulse SpO2 Resp BP Pain Score FACES Pain Rating User   09/30/24 1827 99.4 °F (37.4 °C) 112 99 % 18 116/79 8 -- BG            Physical Exam    Results Reviewed       Procedure Component Value Units Date/Time    HS Troponin 0hr (reflex protocol) [[id number]]  (Normal) Collected: 09/30/24 1910    Lab Status: Final result Specimen: Blood from Arm, Right Updated: 09/30/24 1949     hs TnI 0hr 3 ng/L     Basic metabolic panel [[id number]]  (Abnormal) Collected: 09/30/24 1855    Lab Status: Final result Specimen: Blood from Arm, Right Updated: 09/30/24 1947     Sodium 136 mmol/L      Potassium 3.6 mmol/L      Chloride 103 mmol/L      CO2 26 mmol/L      ANION GAP 7 mmol/L      BUN 19 mg/dL      Creatinine 1.29 mg/dL      Glucose 171 mg/dL      Calcium 8.1  mg/dL      eGFR 59 ml/min/1.73sq m     Narrative:      National Kid[employer] Disease Foundation guidelines for Chronic Kidney Disease (CKD):     Stage 1 with normal or high GFR (GFR > 90 mL/min/1.73 square meters)    Stage 2 Mild CKD (GFR = 60-89 mL/min/1.73 square meters)    Stage 3A Moderate CKD (GFR = 45-59 mL/min/1.73 square meters)    Stage 3B Moderate CKD (GFR = 30-44 mL/min/1.73 square meters)    Stage 4 Severe CKD (GFR = 15-29 mL/min/1.73 square meters)    Stage 5 End Stage CKD (GFR <15 mL/min/1.73 square meters)  Note: GFR calculation is accurate only with a steady state creatinine    CBC and differential [[id number]]  (Abnormal) Collected: 09/30/24 1855    Lab Status: Final result Specimen: Blood from Arm, Right Updated: 09/30/24 1932     WBC 13.65 Thousand/uL      RBC 3.70 Million/uL      Hemoglobin 10.9 g/dL      Hematocrit 31.6 %      MCV 85 fL      MCH 29.5 pg      MCHC 34.5 g/dL      RDW 12.9 %      MPV 9.7 fL      Platelets 224 Thousands/uL      nRBC 0 /100 WBCs      Segmented % 88 %      Immature Grans % 1 %      Lymphocytes % 4 %      Monocytes % 7 %      Eosinophils Relative 0 %      Basophils Relative 0 %      Absolute Neutrophils 11.96 Thousands/µL      Absolute Immature Grans 0.08 Thousand/uL      Absolute Lymphocytes 0.57 Thousands/µL      Absolute Monocytes 0.96 Thousand/µL      Eosinophils Absolute 0.05 Thousand/µL      Basophils Absolute 0.03 Thousands/µL             No orders to display       Procedures    ED Medication and Procedure Management   None     Patient's Medications   Discharge Prescriptions    No medications on file     No discharge procedures on file.  ED SEPSIS DOCUMENTATION   Time reflects when diagnosis was documented in both MDM as applicable and the Disposition within this note       Time User Action Codes Description Comment    9/30/2024  7:55 PM Felicitas Morley [N12] Pyelonephritis     9/30/2024  7:55 PM Felicitas Morley [R11.0] Nausea     9/30/2024  7:55 PM  Felicitas Morley Add [R51.9] Headache                  Felicitas Morley MD  09/30/24 2045

## 2024-10-02 LAB
ANION GAP SERPL CALCULATED.3IONS-SCNC: 10 MMOL/L (ref 4–13)
BUN SERPL-MCNC: 10 MG/DL (ref 5–25)
CALCIUM SERPL-MCNC: 8.2 MG/DL (ref 8.4–10.2)
CHLORIDE SERPL-SCNC: 108 MMOL/L (ref 96–108)
CO2 SERPL-SCNC: 20 MMOL/L (ref 21–32)
CREAT SERPL-MCNC: 0.81 MG/DL (ref 0.6–1.3)
ERYTHROCYTE [DISTWIDTH] IN BLOOD BY AUTOMATED COUNT: 12.9 % (ref 11.6–15.1)
GFR SERPL CREATININE-BSD FRML MDRD: 104 ML/MIN/1.73SQ M
GLUCOSE SERPL-MCNC: 122 MG/DL (ref 65–140)
GLUCOSE SERPL-MCNC: 131 MG/DL (ref 65–140)
GLUCOSE SERPL-MCNC: 135 MG/DL (ref 65–140)
GLUCOSE SERPL-MCNC: 206 MG/DL (ref 65–140)
GLUCOSE SERPL-MCNC: 83 MG/DL (ref 65–140)
HCT VFR BLD AUTO: 31.1 % (ref 34.8–46.1)
HGB BLD-MCNC: 10.3 G/DL (ref 11.5–15.4)
MAGNESIUM SERPL-MCNC: 1.9 MG/DL (ref 1.9–2.7)
MCH RBC QN AUTO: 28.9 PG (ref 26.8–34.3)
MCHC RBC AUTO-ENTMCNC: 33.1 G/DL (ref 31.4–37.4)
MCV RBC AUTO: 87 FL (ref 82–98)
PLATELET # BLD AUTO: 252 THOUSANDS/UL (ref 149–390)
PMV BLD AUTO: 10.3 FL (ref 8.9–12.7)
POTASSIUM SERPL-SCNC: 3.7 MMOL/L (ref 3.5–5.3)
RBC # BLD AUTO: 3.56 MILLION/UL (ref 3.81–5.12)
SODIUM SERPL-SCNC: 138 MMOL/L (ref 135–147)
WBC # BLD AUTO: 7.64 THOUSAND/UL (ref 4.31–10.16)

## 2024-10-02 PROCEDURE — 83735 ASSAY OF MAGNESIUM: CPT | Performed by: PHYSICIAN ASSISTANT

## 2024-10-02 PROCEDURE — 80048 BASIC METABOLIC PNL TOTAL CA: CPT | Performed by: PHYSICIAN ASSISTANT

## 2024-10-02 PROCEDURE — 82948 REAGENT STRIP/BLOOD GLUCOSE: CPT

## 2024-10-02 PROCEDURE — 99232 SBSQ HOSP IP/OBS MODERATE 35: CPT | Performed by: PHYSICIAN ASSISTANT

## 2024-10-02 PROCEDURE — 85027 COMPLETE CBC AUTOMATED: CPT | Performed by: PHYSICIAN ASSISTANT

## 2024-10-02 RX ADMIN — ONDANSETRON 4 MG: 2 INJECTION INTRAMUSCULAR; INTRAVENOUS at 05:11

## 2024-10-02 RX ADMIN — INSULIN LISPRO 1 UNITS: 100 INJECTION, SOLUTION INTRAVENOUS; SUBCUTANEOUS at 11:56

## 2024-10-02 RX ADMIN — ACETAMINOPHEN 975 MG: 325 TABLET ORAL at 16:43

## 2024-10-02 RX ADMIN — CEFTRIAXONE SODIUM 2000 MG: 10 INJECTION, POWDER, FOR SOLUTION INTRAVENOUS at 19:54

## 2024-10-02 RX ADMIN — SODIUM CHLORIDE 100 ML/HR: 0.9 INJECTION, SOLUTION INTRAVENOUS at 08:42

## 2024-10-02 RX ADMIN — SODIUM CHLORIDE 100 ML/HR: 0.9 INJECTION, SOLUTION INTRAVENOUS at 19:56

## 2024-10-02 RX ADMIN — ACETAMINOPHEN 975 MG: 325 TABLET ORAL at 23:17

## 2024-10-02 RX ADMIN — ACETAMINOPHEN 975 MG: 325 TABLET ORAL at 05:10

## 2024-10-02 RX ADMIN — ONDANSETRON 4 MG: 2 INJECTION INTRAMUSCULAR; INTRAVENOUS at 16:44

## 2024-10-02 NOTE — UTILIZATION REVIEW
Initial Clinical Review    WAS OBSERVATION 9/30 CONVERTED TO INPATIENT ADMISSION 10/2 DUE TO CONTINUED STAY REQUIRED TO CARE FOR PATIENT WITH Sepsis on Iv antibiotics. Cultures pending. Fever of 102.     Admission: Date/Time/Statement:   Admission Orders (From admission, onward)       Ordered        10/02/24 1251  INPATIENT ADMISSION  Once            09/30/24 1955  Place in Observation  Once                          Orders Placed This Encounter   Procedures    INPATIENT ADMISSION     Standing Status:   Standing     Number of Occurrences:   1     Order Specific Question:   Level of Care     Answer:   Med Surg [16]     Order Specific Question:   Estimated length of stay     Answer:   More than 2 Midnights     Order Specific Question:   Certification     Answer:   I certify that inpatient services are medically necessary for this patient for a duration of greater than two midnights. See H&P and MD Progress Notes for additional information about the patient's course of treatment.     ED Arrival Information       Expected   -    Arrival   9/30/2024 18:24    Acuity   Urgent              Means of arrival   Walk-In    Escorted by   Family Member    Service   Hospitalist    Admission type   Emergency              Arrival complaint   Flank Pain             Chief Complaint   Patient presents with    Flank Pain     Pt was seen here yesterday and dx with kidney infection. Pt states that she is feeling worse today. Pt not eating or drinking       Initial Presentation: 19 y.o. female to ED presents for right flank pain. Pt with R pyelonephritis dx yesterday with failure of outpatient management--worsening pain. Nausea and vomiting with Poor oral intake.  Tachycardia.  Started on Iv Rocephin and LR.   Admit to Observation Dx; Sepsis and Pyelonephritis. Tachycardia and Elevated Wbc.  Plan; Iv antibiotics. F/u cultures. Iv fluids. Trend Wbc.    10/1  Progress notes; HOPE. Hypokalemia. Creat 1.43 this am, baseline 0.5-0.6. Iv fluids.  "  K 3.4, replete. Check Mag. Urine culture growing E. Coli. Iv antibiotics. Bld cultures. BMP in am.  Iv Tylenol x3, Zofran x2  CT revealed \"Heterogeneous enhancement of the right renal upper pole with perinephric stranding consistent with acute pyelonephritis\"     10/2 Changed to Inpatient status  Progress notes; Hyperglycemia. Intermittently high sugars. SSI TID and QHS.  Fever 102 this am. Iv antibiotics. Creat improved to 0.81.   Continue Iv fluids. BMP in am. Tylenol x3 doses, Iv Zofran x2    Certification Statement: The patient, admitted on an observation basis, will now require > 2 midnight hospital stay due to pyelo, IV abx, blood cultures pending, febrile     Date: 10/3  Day 3: Has surpassed a 2nd midnight with active treatments and services.  Continued on Iv antibiotics, transitioned to po today. Bld cultures. Iv fluids.  Pain and antiemetics control prn.      ED Treatment-Medication Administration from 09/30/2024 1824 to 09/30/2024 2057         Date/Time Order Dose Route Action     09/30/2024 1910 lactated ringers bolus 1,000 mL 1,000 mL Intravenous New Bag     09/30/2024 1910 oxyCODONE (ROXICODONE) IR tablet 5 mg 5 mg Oral Given     09/30/2024 1910 ketorolac (TORADOL) injection 15 mg 15 mg Intravenous Given     09/30/2024 1910 ceftriaxone (ROCEPHIN) 1 g/50 mL in dextrose IVPB 1,000 mg Intravenous New Bag            Scheduled Medications:  cefpodoxime, 200 mg, Oral, BID With Meals  insulin lispro, 1-5 Units, Subcutaneous, TID AC  insulin lispro, 1-5 Units, Subcutaneous, HS    ceftriaxone (ROCEPHIN) 2 g/50 mL in dextrose IVPB  Dose: 2,000 mg  Freq: Every 24 hours Route: IV  Last Dose: Stopped (10/03/24 1154)  Start: 10/01/24 1900 End: 10/03/24 1144    Continuous IV Infusions:  sodium chloride, 100 mL/hr, Intravenous, Continuous      PRN Meds:  acetaminophen, 975 mg, Oral, Q8H PRN 10/1 x3, 10/2 x3, 10/3 x1  ondansetron, 4 mg, Intravenous, Q4H PRN 10/1 x2, 10/2 x2      ED Triage Vitals [09/30/24 3392] "   Temperature Pulse Respirations Blood Pressure SpO2 Pain Score   99.4 °F (37.4 °C) (!) 112 18 116/79 99 % 8     Weight (last 2 days)       None            Vital Signs (last 3 days)       Date/Time Temp Pulse Resp BP MAP (mmHg) SpO2 O2 Device Patient Position - Orthostatic VS Pain    10/03/24 11:25:34 98.6 °F (37 °C) 81 -- -- -- 96 % -- -- --    10/03/24 0807 -- -- -- -- -- -- -- -- 7    10/03/24 0800 -- -- -- -- -- -- -- -- 7    10/03/24 07:44:54 100 °F (37.8 °C) 87 18 142/77 99 97 % -- -- --    10/03/24 02:53:08 97.8 °F (36.6 °C) 75 18 146/81 103 96 % -- -- --    10/02/24 23:20:09 98.4 °F (36.9 °C) 78 17 129/70 90 97 % -- -- --    10/02/24 2000 -- -- -- -- -- -- None (Room air) -- 3    10/02/24 1643 -- -- -- -- -- -- -- -- 6    10/02/24 16:42:41 98.6 °F (37 °C) 79 -- -- -- 99 % -- -- --    10/02/24 16:30:04 98.5 °F (36.9 °C) -- -- -- -- -- -- -- --    10/02/24 12:51:01 98.4 °F (36.9 °C) 80 -- -- -- 97 % -- -- --    10/02/24 0900 -- -- -- -- -- -- None (Room air) -- No Pain    10/02/24 07:23:50 102.1 °F (38.9 °C) 97 16 131/62 85 97 % -- -- 2    10/02/24 05:06:28 102 °F (38.9 °C) 93 17 -- -- 99 % -- -- --    10/01/24 22:45:49 99.3 °F (37.4 °C) 83 17 117/63 81 97 % -- -- --    10/01/24 2030 -- -- -- -- -- -- None (Room air) -- 8    10/01/24 19:17:06 98.4 °F (36.9 °C) 90 -- -- -- 99 % -- -- --    10/01/24 1825 -- -- -- -- -- -- -- -- 7    10/01/24 14:47:02 98.4 °F (36.9 °C) 84 16 117/60 79 98 % -- -- --    10/01/24 0932 -- -- -- -- -- -- -- -- 9    10/01/24 0930 -- -- -- -- -- -- None (Room air) -- 9    10/01/24 07:29:04 98.5 °F (36.9 °C) 95 16 123/58 80 98 % -- -- --    10/01/24 0041 -- -- -- -- -- -- -- -- 5    09/30/24 23:31:58 98.7 °F (37.1 °C) 79 16 120/54 76 99 % None (Room air) Lying --    09/30/24 2130 -- -- -- -- -- -- None (Room air) -- --    09/30/24 2125 -- -- -- -- -- -- -- -- 1 09/30/24 2105 98.5 °F (36.9 °C) 84 16 123/54 77 98 % -- -- --    09/30/24 1827 99.4 °F (37.4 °C) 112 18 116/79 76 99 % None  (Room air) -- 8              Pertinent Labs/Diagnostic Test Results:   Radiology:  No orders to display     Cardiology:  ECG 12 lead   Final Result by Sis Allen MD (09/30 2242)   Age and gender specific ECG analysis    Normal sinus rhythm   Rightward axis   Borderline ECG   No previous ECGs available   Confirmed by Sis Allne ([id number]) on 9/30/2024 10:42:43 PM        GI:  No orders to display           Results from last 7 days   Lab Units 10/03/24  0619 10/02/24  0703 10/01/24  0441 09/30/24  1855   WBC Thousand/uL 6.81 7.64 12.23* 13.65*   HEMOGLOBIN g/dL 11.2* 10.3* 10.4* 10.9*   HEMATOCRIT % 34.7* 31.1* 31.3* 31.6*   PLATELETS Thousands/uL 270 252 225 224   TOTAL NEUT ABS Thousands/µL  --   --   --  11.96*         Results from last 7 days   Lab Units 10/03/24  0619 10/02/24  0703 10/01/24  0441 09/30/24  1855   SODIUM mmol/L 138 138 137 136   POTASSIUM mmol/L 3.9 3.7 3.4* 3.6   CHLORIDE mmol/L 108 108 104 103   CO2 mmol/L 22 20* 23 26   ANION GAP mmol/L 8 10 10 7   BUN mg/dL 8 10 21 19   CREATININE mg/dL 0.70 0.81 1.43* 1.29   EGFR ml/min/1.73sq m 124 104 52 59   CALCIUM mg/dL 8.7 8.2* 8.2* 8.1*   MAGNESIUM mg/dL  --  1.9  --   --      Results from last 7 days   Lab Units 10/01/24  0441   AST U/L 22   ALT U/L 28   ALK PHOS U/L 44   TOTAL PROTEIN g/dL 6.3*   ALBUMIN g/dL 3.5   TOTAL BILIRUBIN mg/dL 0.34     Results from last 7 days   Lab Units 10/03/24  1042 10/03/24  0556 10/02/24  2027 10/02/24  1552 10/02/24  1058 10/02/24  0659 10/01/24  2059 10/01/24  1613 10/01/24  1122   POC GLUCOSE mg/dl 116 98 135 83 206* 131 120 118 143*     Results from last 7 days   Lab Units 10/03/24  0619 10/02/24  0703 10/01/24  0441 09/30/24  1855   GLUCOSE RANDOM mg/dL 97 122 193* 171*         Results from last 7 days   Lab Units 10/01/24  1126   HEMOGLOBIN A1C % 5.5   EAG mg/dl 111       Results from last 7 days   Lab Units 09/30/24  1910   HS TNI 0HR ng/L 3                 Results from last 7 days   Lab Units  09/29/24  1346   CLARITY UA  Turbid   COLOR UA  Light Yellow   SPEC GRAV UA  1.017   PH UA  5.5   GLUCOSE UA mg/dl Negative   KETONES UA mg/dl Negative   BLOOD UA  Large*   PROTEIN UA mg/dl 30 (1+)*   NITRITE UA  Negative   BILIRUBIN UA  Negative   UROBILINOGEN UA (BE) mg/dl <2.0   LEUKOCYTES UA  Large*   WBC UA /hpf Innumerable*   RBC UA /hpf Innumerable*   BACTERIA UA /hpf Occasional   EPITHELIAL CELLS WET PREP /hpf Occasional   MUCUS THREADS  Occasional*       Results from last 7 days   Lab Units 10/01/24  1124 09/29/24  1346   BLOOD CULTURE  No Growth at 24 hrs.  No Growth at 24 hrs.  --    URINE CULTURE   --  20,000-29,000 cfu/ml Escherichia coli*       Past Medical History:   Diagnosis Date    Allergic rhinitis     Morbid obesity (HCC) 10/3/2024     Present on Admission:  **None**      Admitting Diagnosis: Nausea [R11.0]  Pyelonephritis [N12]  Flank pain [R10.9]  Headache [R51.9]  Age/Sex: 19 y.o. female    Network Utilization Review Department  ATTENTION: Please call with any questions or concerns to [phone] and carefully listen to the prompts so that you are directed to the right person. All voicemails are confidential.   For Discharge needs, contact Care Management DC Support Team at [phone] opt. 2  Send all requests for admission clinical reviews, approved or denied determinations and any other requests to dedicated fax number below belonging to the campus where the patient is receiving treatment. List of dedicated fax numbers for the Facilities:  FACILITY NAME UR FAX NUMBER   ADMISSION DENIALS (Administrative/Medical Necessity) [phone]   DISCHARGE SUPPORT TEAM (NETWORK) [phone]   PARENT CHILD HEALTH (Maternity/NICU/Pediatrics) [phone]   [facility] [phone]   [facility] [phone]   [facility] [phone]   [facility] [phone]   [facility]  [phone]   [facility] [phone]   [facility] [phone]   [facility] [phone]   [facility] [phone]   [facility] [phone]   [facility] [phone]   [facility] [phone]

## 2024-10-02 NOTE — PLAN OF CARE
Problem: PAIN - ADULT  Goal: Verbalizes/displays adequate comfort level or baseline comfort level  Description: Interventions:  - Encourage patient to monitor pain and request assistance  - Assess pain using appropriate pain scale  - Administer analgesics based on type and severity of pain and evaluate response  - Implement non-pharmacological measures as appropriate and evaluate response  - Consider cultural and social influences on pain and pain management  - Notify physician/advanced practitioner if interventions unsuccessful or patient reports new pain  Outcome: Progressing     Problem: INFECTION - ADULT  Goal: Absence or prevention of progression during hospitalization  Description: INTERVENTIONS:  - Assess and monitor for signs and symptoms of infection  - Monitor lab/diagnostic results  - Monitor all insertion sites, i.e. indwelling lines, tubes, and drains  - Monitor endotracheal if appropriate and nasal secretions for changes in amount and color  - [place] appropriate cooling/warming therapies per order  - Administer medications as ordered  - Instruct and encourage patient and family to use good hand hygiene technique  - Identify and instruct in appropriate isolation precautions for identified infection/condition  Outcome: Progressing     Problem: SAFETY ADULT  Goal: Patient will remain free of falls  Description: INTERVENTIONS:  - Educate patient/family on patient safety including physical limitations  - Instruct patient to call for assistance with activity   - Consult OT/PT to assist with strengthening/mobility   - Keep Call bell within reach  - Keep bed low and locked with side rails adjusted as appropriate  - Keep care items and personal belongings within reach  - Initiate and maintain comfort rounds  - Make Fall Risk Sign visible to staff  - Offer Toileting every 2 Hours, in advance of need  - Initiate/Maintain alarm  - Obtain necessary fall risk management equipment:   - Apply yellow socks and  bracelet for high fall risk patients  - Consider moving patient to room near nurses station  Outcome: Progressing  Goal: Maintain or return to baseline ADL function  Description: INTERVENTIONS:  -  Assess patient's ability to carry out ADLs; assess patient's baseline for ADL function and identify physical deficits which impact ability to perform ADLs (bathing, care of mouth/teeth, toileting, grooming, dressing, etc.)  - Assess/evaluate cause of self-care deficits   - Assess range of motion  - Assess patient's mobility; develop plan if impaired  - Assess patient's need for assistive devices and provide as appropriate  - Encourage maximum independence but intervene and supervise when necessary  - Involve family in performance of ADLs  - Assess for home care needs following discharge   - Consider OT consult to assist with ADL evaluation and planning for discharge  - Provide patient education as appropriate  Outcome: Progressing  Goal: Maintains/Returns to pre admission functional level  Description: INTERVENTIONS:  - Perform AM-PAC 6 Click Basic Mobility/ Daily Activity assessment daily.  - Set and communicate daily mobility goal to care team and patient/family/caregiver.   - Collaborate with rehabilitation services on mobility goals if consulted  - Perform Range of Motion 3 times a day.  - Reposition patient every 2 hours.  - Dangle patient 3 times a day  - Stand patient 3 times a day  - Ambulate patient 3 times a day  - Out of bed to chair 3 times a day   - Out of bed for meals 3 times a day  - Out of bed for toileting  - Record patient progress and toleration of activity level   Outcome: Progressing     Problem: DISCHARGE PLANNING  Goal: Discharge to home or other facility with appropriate resources  Description: INTERVENTIONS:  - Identify barriers to discharge w/patient and caregiver  - Arrange for needed discharge resources and transportation as appropriate  - Identify discharge learning needs (meds, wound care,  etc.)  - Arrange for interpretive services to assist at discharge as needed  - Refer to Case Management Department for coordinating discharge planning if the patient needs post-hospital services based on physician/advanced practitioner order or complex needs related to functional status, cognitive ability, or social support system  Outcome: Progressing     Problem: Knowledge Deficit  Goal: Patient/family/caregiver demonstrates understanding of disease process, treatment plan, medications, and discharge instructions  Description: Complete learning assessment and assess knowledge base.  Interventions:  - Provide teaching at level of understanding  - Provide teaching via preferred learning methods  Outcome: Progressing

## 2024-10-02 NOTE — ASSESSMENT & PLAN NOTE
Creatinine up to 1.43 on 10/1/24, up from baseline of around 0.5-0.6  In the setting of sepsis/pyelo as above, also recent CT with contrast and multiple doses of Toradol  Avoid hypotension and nephrotoxins  Now improved, creatinine down to 0.81  Continue IVF given fever this AM, encourage PO intake  BMP in AM

## 2024-10-02 NOTE — ASSESSMENT & PLAN NOTE
POA, aeb tachycardia and leukocytosis, in the setting of pyelo as above  Urine culture growing E. Coli, continue ceftriaxone  Blood cultures were not drawn on admission - done 10/1 and are pending  With fever, 102, this AM.   Leukocytosis resolved

## 2024-10-02 NOTE — ASSESSMENT & PLAN NOTE
on AM labs 10/1/24  HbA1c 5.5  Intermittently high sugars, continue SSI TID AC and QHS while admitted

## 2024-10-02 NOTE — PROGRESS NOTES
"Progress Note - Hospitalist   Name: Santa Galdamez 19 y.o. female I MRN: [id number]  Unit/Bed#: -01 I Date of Admission: 9/30/2024   Date of Service: 10/2/2024 I Hospital Day: 0    Assessment & Plan  Sepsis (HCC)  POA, aeb tachycardia and leukocytosis, in the setting of pyelo as above  Urine culture growing E. Coli, continue ceftriaxone  Blood cultures were not drawn on admission - done 10/1 and are pending  With fever, 102, this AM.   Leukocytosis resolved  Pyelonephritis  Patient presented with flank pain  CT on admission revealed \"Heterogeneous enhancement of the right renal upper pole with perinephric stranding consistent with acute pyelonephritis\"  Urine culture growing E. Coli, continue IV ceftriaxone  HOPE (acute kidney injury) (HCC)  Creatinine up to 1.43 on 10/1/24, up from baseline of around 0.5-0.6  In the setting of sepsis/pyelo as above, also recent CT with contrast and multiple doses of Toradol  Avoid hypotension and nephrotoxins  Now improved, creatinine down to 0.81  Continue IVF given fever this AM, encourage PO intake  BMP in AM  Hyperglycemia   on AM labs 10/1/24  HbA1c 5.5  Intermittently high sugars, continue SSI TID AC and QHS while admitted  Hypokalemia  Resolved with repletion  Mag WNL    VTE Pharmacologic Prophylaxis:   Low Risk (Score 0-2) - Encourage Ambulation.    Mobility:   Basic Mobility Inpatient Raw Score: 24  JH-HLM Goal: 8: Walk 250 feet or more  JH-HLM Achieved: 7: Walk 25 feet or more  JH-HLM Goal NOT achieved. Continue with multidisciplinary rounding and encourage appropriate mobility to improve upon JH-HLM goals.    Patient Centered Rounds: I performed bedside rounds with nursing staff today.  Rebecca  Discussions with Specialists or Other Care Team Provider:     Education and Discussions with Family / Patient: Patient declined call to .     Current Length of Stay: 0 day(s)  Current Patient Status: Inpatient   Certification Statement: The patient, " admitted on an observation basis, will now require > 2 midnight hospital stay due to pyelo, IV abx, blood cultures pending, febrile  Discharge Plan: Anticipate discharge tomorrow to home.    Code Status: Level 1 - Full Code    Subjective   Ms. Galdamez reports having fever this AM. Reports intermittent flank pain.     Objective :  Temp:  [98.4 °F (36.9 °C)-102.1 °F (38.9 °C)] 98.4 °F (36.9 °C)  HR:  [80-97] 80  BP: (117-131)/(60-63) 131/62  Resp:  [16-17] 16  SpO2:  [97 %-99 %] 97 %  O2 Device: None (Room air)    Body mass index is 41.63 kg/m².     Input and Output Summary (last 24 hours):   No intake or output data in the 24 hours ending 10/02/24 1253    Physical Exam  Vitals reviewed.   Constitutional:       Appearance: She is obese.      Comments: Patient seen lying in bed, NAD   Cardiovascular:      Rate and Rhythm: Normal rate and regular rhythm.   Pulmonary:      Effort: Pulmonary effort is normal. No respiratory distress.      Breath sounds: Normal breath sounds.   Abdominal:      General: Bowel sounds are normal.      Palpations: Abdomen is soft.      Tenderness: There is no abdominal tenderness.   Musculoskeletal:      Right lower leg: No edema.      Left lower leg: No edema.   Skin:     General: Skin is warm.   Neurological:      Mental Status: She is alert and oriented to person, place, and time.   Psychiatric:         Mood and Affect: Mood normal.         Behavior: Behavior normal.         Lines/Drains:              Lab Results: I have reviewed the following results:   Results from last 7 days   Lab Units 10/02/24  0703 10/01/24  0441 09/30/24  1855   WBC Thousand/uL 7.64   < > 13.65*   HEMOGLOBIN g/dL 10.3*   < > 10.9*   HEMATOCRIT % 31.1*   < > 31.6*   PLATELETS Thousands/uL 252   < > 224   SEGS PCT %  --   --  88*   LYMPHO PCT %  --   --  4*   MONO PCT %  --   --  7   EOS PCT %  --   --  0    < > = values in this interval not displayed.     Results from last 7 days   Lab Units 10/02/24  0703  10/01/24  0441   SODIUM mmol/L 138 137   POTASSIUM mmol/L 3.7 3.4*   CHLORIDE mmol/L 108 104   CO2 mmol/L 20* 23   BUN mg/dL 10 21   CREATININE mg/dL 0.81 1.43*   ANION GAP mmol/L 10 10   CALCIUM mg/dL 8.2* 8.2*   ALBUMIN g/dL  --  3.5   TOTAL BILIRUBIN mg/dL  --  0.34   ALK PHOS U/L  --  44   ALT U/L  --  28   AST U/L  --  22   GLUCOSE RANDOM mg/dL 122 193*         Results from last 7 days   Lab Units 10/02/24  1058 10/02/24  0659 10/01/24  2059 10/01/24  1613 10/01/24  1122   POC GLUCOSE mg/dl 206* 131 120 118 143*     Results from last 7 days   Lab Units 10/01/24  1126   HEMOGLOBIN A1C % 5.5           Recent Cultures (last 7 days):   Results from last 7 days   Lab Units 10/01/24  1124 09/29/24  1346   BLOOD CULTURE  Received in Microbiology Lab. Culture in Progress.  Received in Microbiology Lab. Culture in Progress.  --    URINE CULTURE   --  20,000-29,000 cfu/ml Escherichia coli*       Imaging Results Review: I reviewed radiology reports from this admission including: CT abdomen/pelvis.      Last 24 Hours Medication List:     Current Facility-Administered Medications:     acetaminophen (TYLENOL) tablet 975 mg, Q8H PRN    ceftriaxone (ROCEPHIN) 2 g/50 mL in dextrose IVPB, Q24H, Last Rate: 2,000 mg (10/01/24 1956)    insulin lispro (HumALOG/ADMELOG) 100 units/mL subcutaneous injection 1-5 Units, TID AC **AND** Fingerstick Glucose (POCT), TID AC    insulin lispro (HumALOG/ADMELOG) 100 units/mL subcutaneous injection 1-5 Units, HS    ondansetron (ZOFRAN) injection 4 mg, Q4H PRN    sodium chloride 0.9 % infusion, Continuous, Last Rate: 100 mL/hr (10/02/24 0842)    Administrative Statements   Today, Patient Was Seen By: Nolan Diehl PA-C      **Please Note: This note may have been constructed using a voice recognition system.**

## 2024-10-02 NOTE — PLAN OF CARE
Problem: PAIN - ADULT  Goal: Verbalizes/displays adequate comfort level or baseline comfort level  Description: Interventions:  - Encourage patient to monitor pain and request assistance  - Assess pain using appropriate pain scale  - Administer analgesics based on type and severity of pain and evaluate response  - Implement non-pharmacological measures as appropriate and evaluate response  - Consider cultural and social influences on pain and pain management  - Notify physician/advanced practitioner if interventions unsuccessful or patient reports new pain  Outcome: Progressing     Problem: INFECTION - ADULT  Goal: Absence or prevention of progression during hospitalization  Description: INTERVENTIONS:  - Assess and monitor for signs and symptoms of infection  - Monitor lab/diagnostic results  - Monitor all insertion sites, i.e. indwelling lines, tubes, and drains  - Monitor endotracheal if appropriate and nasal secretions for changes in amount and color  - [place] appropriate cooling/warming therapies per order  - Administer medications as ordered  - Instruct and encourage patient and family to use good hand hygiene technique  - Identify and instruct in appropriate isolation precautions for identified infection/condition  Outcome: Progressing     Problem: SAFETY ADULT  Goal: Patient will remain free of falls  Description: INTERVENTIONS:  - Educate patient/family on patient safety including physical limitations  - Instruct patient to call for assistance with activity   - Consult OT/PT to assist with strengthening/mobility   - Keep Call bell within reach  - Keep bed low and locked with side rails adjusted as appropriate  - Keep care items and personal belongings within reach  - Initiate and maintain comfort rounds  - Make Fall Risk Sign visible to staff  - Offer Toileting every 2 Hours, in advance of need  - Initiate/Maintain alarm  - Obtain necessary fall risk management equipment:   - Apply yellow socks and  bracelet for high fall risk patients  - Consider moving patient to room near nurses station  Outcome: Progressing  Goal: Maintain or return to baseline ADL function  Description: INTERVENTIONS:  -  Assess patient's ability to carry out ADLs; assess patient's baseline for ADL function and identify physical deficits which impact ability to perform ADLs (bathing, care of mouth/teeth, toileting, grooming, dressing, etc.)  - Assess/evaluate cause of self-care deficits   - Assess range of motion  - Assess patient's mobility; develop plan if impaired  - Assess patient's need for assistive devices and provide as appropriate  - Encourage maximum independence but intervene and supervise when necessary  - Involve family in performance of ADLs  - Assess for home care needs following discharge   - Consider OT consult to assist with ADL evaluation and planning for discharge  - Provide patient education as appropriate  Outcome: Progressing  Goal: Maintains/Returns to pre admission functional level  Description: INTERVENTIONS:  - Perform AM-PAC 6 Click Basic Mobility/ Daily Activity assessment daily.  - Set and communicate daily mobility goal to care team and patient/family/caregiver.   - Collaborate with rehabilitation services on mobility goals if consulted  - Perform Range of Motion 3 times a day.  - Reposition patient every 2 hours.  - Dangle patient 3 times a day  - Stand patient 3 times a day  - Ambulate patient 3 times a day  - Out of bed to chair 3 times a day   - Out of bed for meals 3 times a day  - Out of bed for toileting  - Record patient progress and toleration of activity level   Outcome: Progressing     Problem: DISCHARGE PLANNING  Goal: Discharge to home or other facility with appropriate resources  Description: INTERVENTIONS:  - Identify barriers to discharge w/patient and caregiver  - Arrange for needed discharge resources and transportation as appropriate  - Identify discharge learning needs (meds, wound care,  etc.)  - Arrange for interpretive services to assist at discharge as needed  - Refer to Case Management Department for coordinating discharge planning if the patient needs post-hospital services based on physician/advanced practitioner order or complex needs related to functional status, cognitive ability, or social support system  Outcome: Progressing     Problem: Knowledge Deficit  Goal: Patient/family/caregiver demonstrates understanding of disease process, treatment plan, medications, and discharge instructions  Description: Complete learning assessment and assess knowledge base.  Interventions:  - Provide teaching at level of understanding  - Provide teaching via preferred learning methods  Outcome: Progressing

## 2024-10-03 VITALS
HEART RATE: 81 BPM | TEMPERATURE: 98.6 F | WEIGHT: 242.51 LBS | DIASTOLIC BLOOD PRESSURE: 77 MMHG | HEIGHT: 64 IN | RESPIRATION RATE: 18 BRPM | BODY MASS INDEX: 41.4 KG/M2 | OXYGEN SATURATION: 96 % | SYSTOLIC BLOOD PRESSURE: 142 MMHG

## 2024-10-03 PROBLEM — N17.9 AKI (ACUTE KIDNEY INJURY) (HCC): Status: RESOLVED | Noted: 2024-10-01 | Resolved: 2024-10-03

## 2024-10-03 PROBLEM — K76.0 HEPATIC STEATOSIS: Status: ACTIVE | Noted: 2024-10-03

## 2024-10-03 PROBLEM — E87.6 HYPOKALEMIA: Status: RESOLVED | Noted: 2024-10-01 | Resolved: 2024-10-03

## 2024-10-03 PROBLEM — E66.01 MORBID OBESITY (HCC): Status: ACTIVE | Noted: 2024-10-03

## 2024-10-03 LAB
ANION GAP SERPL CALCULATED.3IONS-SCNC: 8 MMOL/L (ref 4–13)
BUN SERPL-MCNC: 8 MG/DL (ref 5–25)
CALCIUM SERPL-MCNC: 8.7 MG/DL (ref 8.4–10.2)
CHLORIDE SERPL-SCNC: 108 MMOL/L (ref 96–108)
CO2 SERPL-SCNC: 22 MMOL/L (ref 21–32)
CREAT SERPL-MCNC: 0.7 MG/DL (ref 0.6–1.3)
ERYTHROCYTE [DISTWIDTH] IN BLOOD BY AUTOMATED COUNT: 13.2 % (ref 11.6–15.1)
GFR SERPL CREATININE-BSD FRML MDRD: 124 ML/MIN/1.73SQ M
GLUCOSE SERPL-MCNC: 116 MG/DL (ref 65–140)
GLUCOSE SERPL-MCNC: 97 MG/DL (ref 65–140)
GLUCOSE SERPL-MCNC: 98 MG/DL (ref 65–140)
HCT VFR BLD AUTO: 34.7 % (ref 34.8–46.1)
HGB BLD-MCNC: 11.2 G/DL (ref 11.5–15.4)
MCH RBC QN AUTO: 28.7 PG (ref 26.8–34.3)
MCHC RBC AUTO-ENTMCNC: 32.3 G/DL (ref 31.4–37.4)
MCV RBC AUTO: 89 FL (ref 82–98)
PLATELET # BLD AUTO: 270 THOUSANDS/UL (ref 149–390)
PMV BLD AUTO: 10 FL (ref 8.9–12.7)
POTASSIUM SERPL-SCNC: 3.9 MMOL/L (ref 3.5–5.3)
RBC # BLD AUTO: 3.9 MILLION/UL (ref 3.81–5.12)
SODIUM SERPL-SCNC: 138 MMOL/L (ref 135–147)
WBC # BLD AUTO: 6.81 THOUSAND/UL (ref 4.31–10.16)

## 2024-10-03 PROCEDURE — 85027 COMPLETE CBC AUTOMATED: CPT | Performed by: PHYSICIAN ASSISTANT

## 2024-10-03 PROCEDURE — 80048 BASIC METABOLIC PNL TOTAL CA: CPT | Performed by: PHYSICIAN ASSISTANT

## 2024-10-03 PROCEDURE — 99239 HOSP IP/OBS DSCHRG MGMT >30: CPT | Performed by: NURSE PRACTITIONER

## 2024-10-03 PROCEDURE — 82948 REAGENT STRIP/BLOOD GLUCOSE: CPT

## 2024-10-03 RX ORDER — CEFPODOXIME PROXETIL 200 MG/1
200 TABLET, FILM COATED ORAL 2 TIMES DAILY WITH MEALS
Start: 2024-10-03 | End: 2024-10-14

## 2024-10-03 RX ORDER — ACETAMINOPHEN 325 MG/1
975 TABLET ORAL EVERY 8 HOURS PRN
Start: 2024-10-03

## 2024-10-03 RX ORDER — SACCHAROMYCES BOULARDII 250 MG
250 CAPSULE ORAL 2 TIMES DAILY
Qty: 30 CAPSULE | Refills: 0 | Status: SHIPPED | OUTPATIENT
Start: 2024-10-03

## 2024-10-03 RX ORDER — CEFPODOXIME PROXETIL 200 MG/1
200 TABLET, FILM COATED ORAL 2 TIMES DAILY WITH MEALS
Status: DISCONTINUED | OUTPATIENT
Start: 2024-10-03 | End: 2024-10-03 | Stop reason: HOSPADM

## 2024-10-03 RX ADMIN — CEFPODOXIME PROXETIL 200 MG: 200 TABLET, FILM COATED ORAL at 13:28

## 2024-10-03 RX ADMIN — ACETAMINOPHEN 975 MG: 325 TABLET ORAL at 08:07

## 2024-10-03 NOTE — ASSESSMENT & PLAN NOTE
RESOLVED  Lab Results   Component Value Date    CREATININE 0.70 10/03/2024    CREATININE 0.81 10/02/2024    CREATININE 1.43 (H) 10/01/2024   Creatinine up to 1.43 on 10/1/24, up from baseline of around 0.5-0.6  In the setting of sepsis/pyelo as above, also recent CT with contrast and multiple doses of Toradol  Avoid hypotension and nephrotoxins- encouraged to avoid NSAIDs after discharge   F/u with PCP for BMP after discharge

## 2024-10-03 NOTE — PLAN OF CARE
Problem: PAIN - ADULT  Goal: Verbalizes/displays adequate comfort level or baseline comfort level  Description: Interventions:  - Encourage patient to monitor pain and request assistance  - Assess pain using appropriate pain scale  - Administer analgesics based on type and severity of pain and evaluate response  - Implement non-pharmacological measures as appropriate and evaluate response  - Consider cultural and social influences on pain and pain management  - Notify physician/advanced practitioner if interventions unsuccessful or patient reports new pain  Outcome: Adequate for Discharge     Problem: INFECTION - ADULT  Goal: Absence or prevention of progression during hospitalization  Description: INTERVENTIONS:  - Assess and monitor for signs and symptoms of infection  - Monitor lab/diagnostic results  - Monitor all insertion sites, i.e. indwelling lines, tubes, and drains  - Monitor endotracheal if appropriate and nasal secretions for changes in amount and color  - [place] appropriate cooling/warming therapies per order  - Administer medications as ordered  - Instruct and encourage patient and family to use good hand hygiene technique  - Identify and instruct in appropriate isolation precautions for identified infection/condition  Outcome: Adequate for Discharge     Problem: SAFETY ADULT  Goal: Patient will remain free of falls  Description: INTERVENTIONS:  - Educate patient/family on patient safety including physical limitations  - Instruct patient to call for assistance with activity   - Consult OT/PT to assist with strengthening/mobility   - Keep Call bell within reach  - Keep bed low and locked with side rails adjusted as appropriate  - Keep care items and personal belongings within reach  - Initiate and maintain comfort rounds  - Make Fall Risk Sign visible to staff  - Offer Toileting every 2-4 Hours, in advance of need  - Initiate/Maintain bed/chair alarm  - Obtain necessary fall risk management  equipment: nonskid footwear   - Apply yellow socks and bracelet for high fall risk patients  - Consider moving patient to room near nurses station  Outcome: Adequate for Discharge  Goal: Maintain or return to baseline ADL function  Description: INTERVENTIONS:  -  Assess patient's ability to carry out ADLs; assess patient's baseline for ADL function and identify physical deficits which impact ability to perform ADLs (bathing, care of mouth/teeth, toileting, grooming, dressing, etc.)  - Assess/evaluate cause of self-care deficits   - Assess range of motion  - Assess patient's mobility; develop plan if impaired  - Assess patient's need for assistive devices and provide as appropriate  - Encourage maximum independence but intervene and supervise when necessary  - Involve family in performance of ADLs  - Assess for home care needs following discharge   - Consider OT consult to assist with ADL evaluation and planning for discharge  - Provide patient education as appropriate  Outcome: Adequate for Discharge  Goal: Maintains/Returns to pre admission functional level  Description: INTERVENTIONS:  - Perform AM-PAC 6 Click Basic Mobility/ Daily Activity assessment daily.  - Set and communicate daily mobility goal to care team and patient/family/caregiver.   - Collaborate with rehabilitation services on mobility goals if consulted  - Perform Range of Motion 3 times a day.  - Reposition patient every 2 hours.  - Dangle patient 3 times a day  - Stand patient 3 times a day  - Ambulate patient 3 times a day  - Out of bed to chair 3 times a day   - Out of bed for meals 3 times a day  - Out of bed for toileting  - Record patient progress and toleration of activity level   Outcome: Adequate for Discharge     Problem: DISCHARGE PLANNING  Goal: Discharge to home or other facility with appropriate resources  Description: INTERVENTIONS:  - Identify barriers to discharge w/patient and caregiver  - Arrange for needed discharge resources and  transportation as appropriate  - Identify discharge learning needs (meds, wound care, etc.)  - Arrange for interpretive services to assist at discharge as needed  - Refer to Case Management Department for coordinating discharge planning if the patient needs post-hospital services based on physician/advanced practitioner order or complex needs related to functional status, cognitive ability, or social support system  Outcome: Adequate for Discharge     Problem: Knowledge Deficit  Goal: Patient/family/caregiver demonstrates understanding of disease process, treatment plan, medications, and discharge instructions  Description: Complete learning assessment and assess knowledge base.  Interventions:  - Provide teaching at level of understanding  - Provide teaching via preferred learning methods  Outcome: Adequate for Discharge     Problem: Nutrition/Hydration-ADULT  Goal: Nutrient/Hydration intake appropriate for improving, restoring or maintaining nutritional needs  Description: Monitor and assess patient's nutrition/hydration status for malnutrition. Collaborate with interdisciplinary team and initiate plan and interventions as ordered.  Monitor patient's weight and dietary intake as ordered or per policy. Utilize nutrition screening tool and intervene as necessary. Determine patient's food preferences and provide high-protein, high-caloric foods as appropriate.     INTERVENTIONS:  - Monitor oral intake, urinary output, labs, and treatment plans  - Assess nutrition and hydration status and recommend course of action  - Evaluate amount of meals eaten  - Assist patient with eating if necessary   - Allow adequate time for meals  - Recommend/ encourage appropriate diets, oral nutritional supplements, and vitamin/mineral supplements  - Order, calculate, and assess calorie counts as needed  - Recommend, monitor, and adjust tube feedings and TPN/PPN based on assessed needs  - Assess need for intravenous fluids  - Provide  specific nutrition/hydration education as appropriate  - Include patient/family/caregiver in decisions related to nutrition  Outcome: Adequate for Discharge

## 2024-10-03 NOTE — ASSESSMENT & PLAN NOTE
POA, aeb tachycardia and leukocytosis, in the setting of pyelo as above  Urine culture growing E. Coli, continue ceftriaxone  Blood cultures were not drawn on admission - done 10/1 and negative for 24hrs   Noted febrile 102 on 10/2- afebrile now for 24 hours and patient reporting overall improvement   Leukocytosis resolved

## 2024-10-03 NOTE — ASSESSMENT & PLAN NOTE
on AM labs 10/1/24- todays fasting glucose 97  HbA1c 5.5  Encourage diet and weight loss after discharge

## 2024-10-03 NOTE — DISCHARGE SUMMARY
"Discharge Summary - Hospitalist   Name: Santa Galdamez 19 y.o. female I MRN: [id number]  Unit/Bed#: -01 I Date of Admission: 9/30/2024   Date of Service: 10/3/2024 I Hospital Day: 1     Assessment & Plan  Sepsis (HCC)  POA, aeb tachycardia and leukocytosis, in the setting of pyelo as above  Urine culture growing E. Coli, continue ceftriaxone  Blood cultures were not drawn on admission - done 10/1 and negative for 24hrs   Noted febrile 102 on 10/2- afebrile now for 24 hours and patient reporting overall improvement   Leukocytosis resolved  Pyelonephritis  Patient presented with flank pain  CT on admission revealed \"Heterogeneous enhancement of the right renal upper pole with perinephric stranding consistent with acute pyelonephritis\"  Urine culture growing E. Coli and was started on CTX and received three doses  Pt was seen in the ER and discharged prior to being admitted and RX for vantin was sent and patient reports she has the prescription at home- will continue at discharge for 11 more days   HOPE (acute kidney injury) (HCC) (Resolved: 10/3/2024)  RESOLVED  Lab Results   Component Value Date    CREATININE 0.70 10/03/2024    CREATININE 0.81 10/02/2024    CREATININE 1.43 (H) 10/01/2024   Creatinine up to 1.43 on 10/1/24, up from baseline of around 0.5-0.6  In the setting of sepsis/pyelo as above, also recent CT with contrast and multiple doses of Toradol  Avoid hypotension and nephrotoxins- encouraged to avoid NSAIDs after discharge   F/u with PCP for BMP after discharge   Hyperglycemia   on AM labs 10/1/24- todays fasting glucose 97  HbA1c 5.5  Encourage diet and weight loss after discharge   Hypokalemia (Resolved: 10/3/2024)  Resolved with repletion  Mag WNL  Morbid obesity (HCC)  BMI 41.63- encourage diet and exercise   Hepatic steatosis  Noted on CT A/P  F/u after discharge and monitor   LFTs on admission normal   Encourage diet and weight loss after discharge      Medical Problems       Resolved " Problems  Date Reviewed: 10/3/2024            Resolved    HOPE (acute kidney injury) (HCC) 10/3/2024     Resolved by  RUBEN Izquierdo    Hypokalemia 10/3/2024     Resolved by  RUBEN Izquierdo        Discharging Physician / Practitioner: RUBEN Izquierdo  PCP: No primary care provider on file.  Admission Date:   Admission Orders (From admission, onward)       Ordered        10/02/24 1251  INPATIENT ADMISSION  Once            09/30/24 1955  Place in Observation  Once                          Discharge Date: 10/03/24    Consultations During Hospital Stay:  None     Procedures Performed:   CT abdomen and pelvis 9/29/24- Heterogeneous enhancement of the right renal upper pole with perinephric stranding consistent with acute pyelonephritis. Hepatic steatosis noted     Significant Findings / Test Results:   Pyelonephritis     Incidental Findings:   None     Test Results Pending at Discharge (will require follow up):   None      Outpatient Tests Requested:  BMP in one week with PCP     Complications:  none     Reason for Admission: right flank pain     Hospital Course:   Santa Galdamez is a 19 y.o. female patient who originally presented to the hospital on 9/30/2024 due to worsening right flank pain.  The patient was also experiencing nausea vomiting and diarrhea with poor p.o. intake.  The patient was diagnosed with pyelonephritis in the emergency room the day prior and discharged on oral antibiotics.  The patient reported that her symptoms have worsened and she presented back to the emergency room was admitted for pyelonephritis.  She was started on ceftriaxone and improved throughout her hospital stay.  The patient will be discharged on the remaining antibiotics she got from the emergency room on her prior admission which was Vantin for 11 more days.  She was noted to have acute kidney injury and volume resuscitated and her creatinine has now normalized.  The patient was told not to take any NSAIDs at  "discharge and to follow-up with PCP for BMP in 1 week.      Please see above list of diagnoses and related plan for additional information.     Condition at Discharge: good    Discharge Day Visit / Exam:   Subjective: Patient reports she is feeling much better today.  She feels like everything is flushing out of her system.  She reports that her right flank pain that she was experiencing is now resolved.  She reports some loose stools but denies overt diarrhea.  She denies any abdominal pain.  Denies any further episodes of fever.  Vitals: Blood Pressure: 142/77 (10/03/24 0744)  Pulse: 81 (10/03/24 1125)  Temperature: 98.6 °F (37 °C) (10/03/24 1125)  Temp Source: Oral (09/30/24 2331)  Respirations: 18 (10/03/24 0744)  Height: 5' 4\" (162.6 cm) (09/30/24 2058)  Weight - Scale: 110 kg (242 lb 8.1 oz) (09/30/24 2058)  SpO2: 96 % (10/03/24 1125)  Physical Exam  Constitutional:       General: She is not in acute distress.     Appearance: She is morbidly obese. She is not ill-appearing.   Cardiovascular:      Rate and Rhythm: Normal rate and regular rhythm.      Heart sounds: Normal heart sounds. No murmur heard.  Pulmonary:      Effort: Pulmonary effort is normal. No respiratory distress.      Breath sounds: Normal breath sounds. No wheezing or rales.   Abdominal:      General: Bowel sounds are normal. There is no distension.      Palpations: Abdomen is soft.      Tenderness: There is no abdominal tenderness. There is no right CVA tenderness or left CVA tenderness.   Musculoskeletal:         General: No swelling or tenderness.      Cervical back: Neck supple.   Skin:     General: Skin is warm and dry.      Findings: No erythema or rash.   Neurological:      Mental Status: She is alert and oriented to person, place, and time. Mental status is at baseline.   Psychiatric:         Mood and Affect: Mood normal.          Discussion with Family: Patient declined call to .     Discharge instructions/Information to " patient and family:   See after visit summary for information provided to patient and family.      Provisions for Follow-Up Care:  See after visit summary for information related to follow-up care and any pertinent home health orders.      Mobility at time of Discharge:   Basic Mobility Inpatient Raw Score: 24  JH-HLM Goal: 8: Walk 250 feet or more  JH-HLM Achieved: 8: Walk 250 feet ot more  HLM Goal achieved. Continue to encourage appropriate mobility.     Disposition:   Home    Planned Readmission: no    Discharge Medications:  See after visit summary for reconciled discharge medications provided to patient and/or family.      Administrative Statements   Discharge Statement:  I have spent a total time of 60 minutes in caring for this patient on the day of the visit/encounter. >30 minutes of time was spent on: Diagnostic results, Instructions for management, Patient and family education, Importance of tx compliance, Counseling / Coordination of care, and Documenting in the medical record.    **Please Note: This note may have been constructed using a voice recognition system**

## 2024-10-03 NOTE — ASSESSMENT & PLAN NOTE
Noted on CT A/P  F/u after discharge and monitor   LFTs on admission normal   Encourage diet and weight loss after discharge

## 2024-10-03 NOTE — ASSESSMENT & PLAN NOTE
"Patient presented with flank pain  CT on admission revealed \"Heterogeneous enhancement of the right renal upper pole with perinephric stranding consistent with acute pyelonephritis\"  Urine culture growing E. Coli and was started on CTX and received three doses  Pt was seen in the ER and discharged prior to being admitted and RX for vantin was sent and patient reports she has the prescription at home- will continue at discharge for 11 more days   "

## 2024-10-03 NOTE — UTILIZATION REVIEW
"NOTIFICATION OF INPATIENT ADMISSION   AUTHORIZATION REQUEST   SERVICING FACILITY:   [facility]  Address: [address] [place] [address]  Tax ID: [id number]  NPI: [id number] ATTENDING PROVIDER:  Attending Name and NPI#: Kaia Lei Md [[id number]]  Address: [address] [place] [address]  Phone: [phone]   ADMISSION INFORMATION:  Place of Service: Inpatient [facility]  Place of Service Code: 21  Inpatient Admission Date/Time: 10/2/24 12:51 PM  Discharge Date/Time: No discharge date for patient encounter.  Admitting Diagnosis Code/Description:  Nausea [R11.0]  Pyelonephritis [N12]  Flank pain [R10.9]  Headache [R51.9]     UTILIZATION REVIEW CONTACT:  Mahsa Loera"Danna\" Sung Utilization   Network Utilization Review Department  Phone: [phone]  Fax: [phone]  Email: [email or web address]  Contact for approvals/pending authorizations, clinical reviews, and discharge.     PHYSICIAN ADVISORY SERVICES:  Medical Necessity Denial & Rkxu-cp-Qcfo Review  Phone: [phone]  Fax: [phone]  Email: [email or web address]     DISCHARGE SUPPORT TEAM:  For Patients Discharge Needs & Updates  Phone: [phone] opt. 2 Fax: [phone]  Email: [email or web address]     "

## 2024-10-06 LAB
BACTERIA BLD CULT: NORMAL
BACTERIA BLD CULT: NORMAL

## 2025-04-09 NOTE — ED PROVIDER NOTES
History  Chief Complaint   Patient presents with   • Back Pain     Pt states she was carrying something and stepped on a step stool that tipped, and in effort to catch herself, she injured her lower back  Pain with movement  Camden Kunz is a 21 y o  female with no significant PMHx presenting to the ED c/o lower back pain which began today at school  Patient reports that she was working on a project when she stepped on a short stepping stool which tipped over, causing patient to lose her balance  Patient reports that she quickly hopped off and regained her balance however in the process feels that she twisted her torso and pulled something in her lower back  Patient reports left-sided lower back pain which radiates into her buttocks and down her left leg  Patient reports that her lower back pain is worse with movement and also worse after she sits for long period of time  She denies ever falling, head strike, or loss of consciousness  She has not attempted any treatment at home  Patient denies any chance of pregnancy  Patient denies any urinary symptoms, fevers, chills, abdominal pain, chest pain, numbness, tingling, or weakness  None       Past Medical History:   Diagnosis Date   • Allergic rhinitis        Past Surgical History:   Procedure Laterality Date   • TONSILLECTOMY         Family History   Problem Relation Age of Onset   • Crohn's disease Mother    • No Known Problems Father    • Asthma Brother    • Diabetes Family    • Hyperlipidemia Family    • Heart disease Family      I have reviewed and agree with the history as documented      E-Cigarette/Vaping   • E-Cigarette Use Never User      E-Cigarette/Vaping Substances     Social History     Tobacco Use   • Smoking status: Never   • Smokeless tobacco: Never   Vaping Use   • Vaping Use: Never used   Substance Use Topics   • Alcohol use: No     Comment: No alcohol use - As per [employer]    • Drug use: No     Comment: Denied: History of Patient called writer back regarding Requip refill.   Patient states she is taking Requip 1 mg tablet BID. She states Requip makes her nauseous. She did have one bout of vomiting after taking the medication. Patient did state it does help calm her restless leg before bed, but she still experiences quite a bit of shaking during the day.    Sent to PCP for review.  Please advise on refill.     drug use - As per [employer]        Review of Systems   Constitutional: Negative for chills and fever  HENT: Negative for ear pain and sore throat  Eyes: Negative for pain and visual disturbance  Respiratory: Negative for cough and shortness of breath  Cardiovascular: Negative for chest pain and palpitations  Gastrointestinal: Negative for abdominal pain and vomiting  Genitourinary: Negative for dysuria and hematuria  Musculoskeletal: Positive for back pain  Negative for arthralgias  Skin: Negative for color change and rash  Neurological: Negative for seizures and syncope  All other systems reviewed and are negative  Physical Exam  Physical Exam  Vitals and nursing note reviewed  Constitutional:       General: She is not in acute distress  Appearance: She is well-developed  She is not ill-appearing, toxic-appearing or diaphoretic  HENT:      Head: Normocephalic and atraumatic  Eyes:      Conjunctiva/sclera: Conjunctivae normal    Cardiovascular:      Rate and Rhythm: Normal rate and regular rhythm  Heart sounds: No murmur heard  Pulmonary:      Effort: Pulmonary effort is normal  No respiratory distress  Breath sounds: Normal breath sounds  No stridor  No wheezing, rhonchi or rales  Abdominal:      General: There is no distension  Palpations: Abdomen is soft  There is no mass  Tenderness: There is no abdominal tenderness  Musculoskeletal:         General: No swelling  Cervical back: Neck supple  Back:    Skin:     General: Skin is warm and dry  Capillary Refill: Capillary refill takes less than 2 seconds  Neurological:      Mental Status: She is alert     Psychiatric:         Mood and Affect: Mood normal          Vital Signs  ED Triage Vitals [05/16/23 1811]   Temperature Pulse Respirations Blood Pressure SpO2   98 7 °F (37 1 °C) 71 16 118/59 98 %      Temp Source Heart Rate Source Patient Position - Orthostatic VS BP Location FiO2 (%) "  Oral Monitor Sitting Right arm --      Pain Score       8           Vitals:    05/16/23 1811   BP: 118/59   Pulse: 71   Patient Position - Orthostatic VS: Sitting         Visual Acuity      ED Medications  Medications   ketorolac (TORADOL) injection 15 mg (15 mg Intramuscular Given 5/16/23 1838)   acetaminophen (TYLENOL) tablet 975 mg (975 mg Oral Given 5/16/23 1837)       Diagnostic Studies  Results Reviewed     None                 No orders to display              Procedures  Procedures         ED Course         CRAFFT    Flowsheet Row Most Recent Value   CRAFFT Initial Screen: During the past 12 months, did you:    1  Drink any alcohol (more than a few sips)? No Filed at: 05/16/2023 1814   2  Smoke any marijuana or hashish No Filed at: 05/16/2023 1814   3  Use anything else to get high? (\"anything else\" includes illegal drugs, over the counter and prescription drugs, and things that you sniff or 'suarez')? No Filed at: 05/16/2023 Nima Cunningham is a 21 y o  female with no significant PMHx presenting to the ED c/o lower back pain which began today at school  Patient lost balance when stepping stool and while attempting to regain balance twisted her back  On exam patient is well-appearing and in no acute distress  Vital signs are within normal limits  Physical examination reveals tenderness to palpation of the left-sided paraspinal lumbar region  No midline spinal tenderness, crepitus, deformity, step-off, or overlying skin changes  Full range of motion  Strength and sensation intact distally  DP pulses 2+ bilaterally  Examination is otherwise unremarkable  Suspect muscular strain of the lumbar back  Discussed with mother and patient appropriate supportive care  Ordered Toradol and Tylenol in ER  Patient reports that she has never been sexually active and denies any chance of pregnancy    Sent prescription for naproxen and Tylenol " to pharmacy  Advise close follow-up with PCP for reevaluation and advised strict return precautions to the ER  Patient and mother are understanding and agreeable with plan  Acute low back pain: acute illness or injury  Risk  OTC drugs  Prescription drug management  Disposition  Final diagnoses:   Acute low back pain     Time reflects when diagnosis was documented in both MDM as applicable and the Disposition within this note     Time User Action Codes Description Comment    5/16/2023  6:46 PM Risa Alvarado [M54 50] Acute low back pain       ED Disposition     ED Disposition   Discharge    Condition   Stable    Date/Time   Tue May 16, 2023  6:46 PM    Comment   Lisettekelli Aguilar discharge to home/self care  Follow-up Information     Follow up With Specialties Details Why Contact Info Additional Information    [address]  [facility] Emergency Department Emergency Medicine  If symptoms worsen [facility] [id number]  [address] [place] [address] Emergency Department, [address] [place] [address], [id number]          Discharge Medication List as of 5/16/2023  6:49 PM      START taking these medications    Details   acetaminophen (TYLENOL) 500 mg tablet Take 1 tablet (500 mg total) by mouth every 6 (six) hours as needed for mild pain, Starting Tue 5/16/2023, Normal      naproxen (Naprosyn) 500 mg tablet Take 1 tablet (500 mg total) by mouth 2 (two) times a day with meals, Starting Tue 5/16/2023, Normal             No discharge procedures on file      PDMP Review     None          ED Provider  Electronically Signed by           Dora Liang PA-C  05/16/23 7005